# Patient Record
Sex: FEMALE | Race: BLACK OR AFRICAN AMERICAN | NOT HISPANIC OR LATINO | Employment: FULL TIME | URBAN - METROPOLITAN AREA
[De-identification: names, ages, dates, MRNs, and addresses within clinical notes are randomized per-mention and may not be internally consistent; named-entity substitution may affect disease eponyms.]

---

## 2024-01-10 ENCOUNTER — LAB (OUTPATIENT)
Dept: LAB | Facility: LAB | Age: 54
End: 2024-01-10

## 2024-01-10 ENCOUNTER — OFFICE VISIT (OUTPATIENT)
Dept: PRIMARY CARE | Facility: CLINIC | Age: 54
End: 2024-01-10
Payer: COMMERCIAL

## 2024-01-10 VITALS
SYSTOLIC BLOOD PRESSURE: 150 MMHG | OXYGEN SATURATION: 97 % | BODY MASS INDEX: 42.8 KG/M2 | HEART RATE: 82 BPM | DIASTOLIC BLOOD PRESSURE: 80 MMHG | WEIGHT: 266.32 LBS | TEMPERATURE: 98 F | HEIGHT: 66 IN | RESPIRATION RATE: 18 BRPM

## 2024-01-10 DIAGNOSIS — Z80.3 FAMILY HISTORY OF BREAST CANCER: ICD-10-CM

## 2024-01-10 DIAGNOSIS — D50.9 IRON DEFICIENCY ANEMIA, UNSPECIFIED IRON DEFICIENCY ANEMIA TYPE: ICD-10-CM

## 2024-01-10 DIAGNOSIS — E66.01 CLASS 3 SEVERE OBESITY WITHOUT SERIOUS COMORBIDITY WITH BODY MASS INDEX (BMI) OF 40.0 TO 44.9 IN ADULT, UNSPECIFIED OBESITY TYPE (MULTI): ICD-10-CM

## 2024-01-10 DIAGNOSIS — I10 PRIMARY HYPERTENSION: ICD-10-CM

## 2024-01-10 DIAGNOSIS — I10 PRIMARY HYPERTENSION: Primary | ICD-10-CM

## 2024-01-10 DIAGNOSIS — E55.9 VITAMIN D DEFICIENCY: ICD-10-CM

## 2024-01-10 DIAGNOSIS — Z01.419 WOMEN'S ANNUAL ROUTINE GYNECOLOGICAL EXAMINATION: ICD-10-CM

## 2024-01-10 DIAGNOSIS — Z12.31 ENCOUNTER FOR SCREENING MAMMOGRAM FOR BREAST CANCER: ICD-10-CM

## 2024-01-10 DIAGNOSIS — Z12.11 ENCOUNTER FOR SCREENING COLONOSCOPY: ICD-10-CM

## 2024-01-10 DIAGNOSIS — Z80.8 FAMILY HISTORY OF THYROID CANCER: ICD-10-CM

## 2024-01-10 PROBLEM — M47.22 OSTEOARTHRITIS OF SPINE WITH RADICULOPATHY, CERVICAL REGION: Status: ACTIVE | Noted: 2024-01-10

## 2024-01-10 PROBLEM — M54.2 NECK PAIN, ACUTE: Status: RESOLVED | Noted: 2024-01-10 | Resolved: 2024-01-10

## 2024-01-10 PROBLEM — R10.2 PELVIC PAIN IN FEMALE: Status: RESOLVED | Noted: 2024-01-10 | Resolved: 2024-01-10

## 2024-01-10 PROBLEM — E66.813 CLASS 3 SEVERE OBESITY IN ADULT: Status: ACTIVE | Noted: 2024-01-10

## 2024-01-10 PROBLEM — K29.70 GASTRITIS: Status: ACTIVE | Noted: 2024-01-10

## 2024-01-10 PROBLEM — H18.603 KERATOCONUS OF BOTH EYES: Status: ACTIVE | Noted: 2024-01-10

## 2024-01-10 PROBLEM — R10.812 ABDOMINAL LEFT UPPER QUADRANT TENDERNESS: Status: RESOLVED | Noted: 2024-01-10 | Resolved: 2024-01-10

## 2024-01-10 PROBLEM — L29.9 EAR ITCHING: Status: RESOLVED | Noted: 2024-01-10 | Resolved: 2024-01-10

## 2024-01-10 PROBLEM — R09.81 SINUS CONGESTION: Status: RESOLVED | Noted: 2024-01-10 | Resolved: 2024-01-10

## 2024-01-10 PROBLEM — D36.9 DERMOID CYST: Status: RESOLVED | Noted: 2024-01-10 | Resolved: 2024-01-10

## 2024-01-10 PROBLEM — T75.3XXA MOTION SICKNESS: Status: RESOLVED | Noted: 2024-01-10 | Resolved: 2024-01-10

## 2024-01-10 PROBLEM — Z86.59 HISTORY OF PICA: Status: RESOLVED | Noted: 2024-01-10 | Resolved: 2024-01-10

## 2024-01-10 PROBLEM — E28.2 PCOS (POLYCYSTIC OVARIAN SYNDROME): Status: ACTIVE | Noted: 2024-01-10

## 2024-01-10 PROBLEM — R05.9 COUGH: Status: RESOLVED | Noted: 2024-01-10 | Resolved: 2024-01-10

## 2024-01-10 PROBLEM — R10.10 INTERMITTENT UPPER ABDOMINAL PAIN: Status: RESOLVED | Noted: 2024-01-10 | Resolved: 2024-01-10

## 2024-01-10 PROBLEM — R53.83 FATIGUE: Status: RESOLVED | Noted: 2024-01-10 | Resolved: 2024-01-10

## 2024-01-10 PROBLEM — T78.40XA ALLERGIES: Status: ACTIVE | Noted: 2024-01-10

## 2024-01-10 PROBLEM — G47.33 OBSTRUCTIVE SLEEP APNEA: Status: ACTIVE | Noted: 2024-01-10

## 2024-01-10 PROBLEM — M47.812 DEGENERATIVE ARTHRITIS OF CERVICAL SPINE: Status: ACTIVE | Noted: 2024-01-10

## 2024-01-10 PROBLEM — L68.0 HIRSUTISM: Status: ACTIVE | Noted: 2024-01-10

## 2024-01-10 PROBLEM — R10.9 LEFT FLANK PAIN: Status: RESOLVED | Noted: 2024-01-10 | Resolved: 2024-01-10

## 2024-01-10 LAB
25(OH)D3 SERPL-MCNC: 16 NG/ML (ref 30–100)
ALBUMIN SERPL BCP-MCNC: 4.2 G/DL (ref 3.4–5)
ALP SERPL-CCNC: 85 U/L (ref 33–110)
ALT SERPL W P-5'-P-CCNC: 17 U/L (ref 7–45)
ANION GAP SERPL CALC-SCNC: 11 MMOL/L (ref 10–20)
AST SERPL W P-5'-P-CCNC: 15 U/L (ref 9–39)
BASOPHILS # BLD AUTO: 0.03 X10*3/UL (ref 0–0.1)
BASOPHILS NFR BLD AUTO: 0.5 %
BILIRUB SERPL-MCNC: 0.4 MG/DL (ref 0–1.2)
BUN SERPL-MCNC: 9 MG/DL (ref 6–23)
CALCIUM SERPL-MCNC: 8.9 MG/DL (ref 8.6–10.6)
CHLORIDE SERPL-SCNC: 105 MMOL/L (ref 98–107)
CHOLEST SERPL-MCNC: 186 MG/DL (ref 0–199)
CHOLESTEROL/HDL RATIO: 4.7
CO2 SERPL-SCNC: 30 MMOL/L (ref 21–32)
CREAT SERPL-MCNC: 0.91 MG/DL (ref 0.5–1.05)
EGFRCR SERPLBLD CKD-EPI 2021: 77 ML/MIN/1.73M*2
EOSINOPHIL # BLD AUTO: 0.16 X10*3/UL (ref 0–0.7)
EOSINOPHIL NFR BLD AUTO: 2.9 %
ERYTHROCYTE [DISTWIDTH] IN BLOOD BY AUTOMATED COUNT: 14.5 % (ref 11.5–14.5)
GLUCOSE SERPL-MCNC: 127 MG/DL (ref 74–99)
HCT VFR BLD AUTO: 39.5 % (ref 36–46)
HDLC SERPL-MCNC: 39.3 MG/DL
HGB BLD-MCNC: 13.2 G/DL (ref 12–16)
IMM GRANULOCYTES # BLD AUTO: 0 X10*3/UL (ref 0–0.7)
IMM GRANULOCYTES NFR BLD AUTO: 0 % (ref 0–0.9)
LDLC SERPL CALC-MCNC: 118 MG/DL
LYMPHOCYTES # BLD AUTO: 1.91 X10*3/UL (ref 1.2–4.8)
LYMPHOCYTES NFR BLD AUTO: 34 %
MCH RBC QN AUTO: 28.8 PG (ref 26–34)
MCHC RBC AUTO-ENTMCNC: 33.4 G/DL (ref 32–36)
MCV RBC AUTO: 86 FL (ref 80–100)
MONOCYTES # BLD AUTO: 0.32 X10*3/UL (ref 0.1–1)
MONOCYTES NFR BLD AUTO: 5.7 %
NEUTROPHILS # BLD AUTO: 3.19 X10*3/UL (ref 1.2–7.7)
NEUTROPHILS NFR BLD AUTO: 56.9 %
NON HDL CHOLESTEROL: 147 MG/DL (ref 0–149)
NRBC BLD-RTO: 0 /100 WBCS (ref 0–0)
PLATELET # BLD AUTO: 213 X10*3/UL (ref 150–450)
POTASSIUM SERPL-SCNC: 3.9 MMOL/L (ref 3.5–5.3)
PROT SERPL-MCNC: 6.7 G/DL (ref 6.4–8.2)
RBC # BLD AUTO: 4.58 X10*6/UL (ref 4–5.2)
SODIUM SERPL-SCNC: 142 MMOL/L (ref 136–145)
TRIGL SERPL-MCNC: 142 MG/DL (ref 0–149)
VLDL: 28 MG/DL (ref 0–40)
WBC # BLD AUTO: 5.6 X10*3/UL (ref 4.4–11.3)

## 2024-01-10 PROCEDURE — 1036F TOBACCO NON-USER: CPT | Performed by: INTERNAL MEDICINE

## 2024-01-10 PROCEDURE — 3077F SYST BP >= 140 MM HG: CPT | Performed by: INTERNAL MEDICINE

## 2024-01-10 PROCEDURE — 80061 LIPID PANEL: CPT

## 2024-01-10 PROCEDURE — 3079F DIAST BP 80-89 MM HG: CPT | Performed by: INTERNAL MEDICINE

## 2024-01-10 PROCEDURE — 3008F BODY MASS INDEX DOCD: CPT | Performed by: INTERNAL MEDICINE

## 2024-01-10 PROCEDURE — 36415 COLL VENOUS BLD VENIPUNCTURE: CPT

## 2024-01-10 PROCEDURE — 85025 COMPLETE CBC W/AUTO DIFF WBC: CPT

## 2024-01-10 PROCEDURE — 80053 COMPREHEN METABOLIC PANEL: CPT

## 2024-01-10 PROCEDURE — 99204 OFFICE O/P NEW MOD 45 MIN: CPT | Performed by: INTERNAL MEDICINE

## 2024-01-10 PROCEDURE — 82306 VITAMIN D 25 HYDROXY: CPT

## 2024-01-10 ASSESSMENT — ENCOUNTER SYMPTOMS
SPEECH DIFFICULTY: 0
TREMORS: 0
DYSURIA: 0
PALPITATIONS: 0
BRUISES/BLEEDS EASILY: 0
POLYPHAGIA: 0
HEMATURIA: 0
FLANK PAIN: 0
DIAPHORESIS: 0
ARTHRALGIAS: 0
BACK PAIN: 0
PHOTOPHOBIA: 0
CHILLS: 0
STRIDOR: 0
NUMBNESS: 0
WEAKNESS: 0
DIZZINESS: 0
FATIGUE: 0
ADENOPATHY: 0
SEIZURES: 0
APPETITE CHANGE: 0
HEADACHES: 0
COUGH: 0

## 2024-01-10 ASSESSMENT — PAIN SCALES - GENERAL: PAINLEVEL: 0-NO PAIN

## 2024-01-10 NOTE — PATIENT INSTRUCTIONS
Have screening mammogram.  Schedule screening colonoscopy.  Have fasting blood work.  Referral made to see .  See gynecologist for pelvic exam.  Follow-up in 2 months for annual physical exam.

## 2024-01-10 NOTE — PROGRESS NOTES
"Subjective   Patient ID: Larisa Villagomez is a 53 y.o. female who presents for New Patient Visit.    New patient visit.  Did not see a primary care physician in more than  1  year.  She takes no prescription medications.  BMI 42.  Diagnosed in the past with mild obstructive sleep apnea and does not use CPAP.  Has positive family history of breast cancer mother, and thyroid cancer Brother.  Would like to have genetic testing.         Review of Systems   Constitutional:  Negative for appetite change, chills, diaphoresis and fatigue.   HENT:  Negative for congestion, ear discharge, hearing loss, mouth sores and postnasal drip.    Eyes:  Negative for photophobia and visual disturbance.   Respiratory:  Negative for cough and stridor.    Cardiovascular:  Negative for palpitations and leg swelling.   Endocrine: Negative for cold intolerance, heat intolerance, polyphagia and polyuria.   Genitourinary:  Negative for decreased urine volume, dysuria, enuresis, flank pain and hematuria.   Musculoskeletal:  Negative for arthralgias, back pain and gait problem.   Allergic/Immunologic: Negative for food allergies and immunocompromised state.   Neurological:  Negative for dizziness, tremors, seizures, syncope, speech difficulty, weakness, numbness and headaches.   Hematological:  Negative for adenopathy. Does not bruise/bleed easily.       Objective   Pulse 82   Temp 36.7 °C (98 °F) (Temporal)   Resp 18   Ht 1.68 m (5' 6.14\")   Wt 121 kg (266 lb 5.1 oz)   SpO2 97%   BMI 42.80 kg/m²     Physical Exam  Constitutional:       Appearance: Normal appearance. She is obese.   HENT:      Head: Normocephalic and atraumatic.      Right Ear: External ear normal.      Left Ear: External ear normal.      Mouth/Throat:      Mouth: Mucous membranes are moist.      Pharynx: Oropharynx is clear.   Neck:      Vascular: No carotid bruit.   Cardiovascular:      Rate and Rhythm: Normal rate and regular rhythm.      Heart sounds: No murmur " heard.     No gallop.   Pulmonary:      Effort: Pulmonary effort is normal. No respiratory distress.      Breath sounds: No wheezing or rales.   Abdominal:      General: Abdomen is flat.      Palpations: Abdomen is soft.      Hernia: No hernia is present.   Genitourinary:     Comments: Not examined  Musculoskeletal:         General: No swelling, tenderness, deformity or signs of injury.      Cervical back: No rigidity or tenderness.      Right lower leg: No edema.   Lymphadenopathy:      Cervical: No cervical adenopathy.   Skin:     Coloration: Skin is not jaundiced or pale.      Findings: No bruising, erythema, lesion or rash.   Neurological:      General: No focal deficit present.      Mental Status: She is oriented to person, place, and time.      Motor: No weakness.      Coordination: Coordination normal.   Psychiatric:         Mood and Affect: Mood normal.         Behavior: Behavior normal.         Assessment/Plan   Problem List Items Addressed This Visit             ICD-10-CM    Anemia, iron deficiency D50.9     Check CBC with differential iron studies.         Relevant Orders    CBC and Auto Differential    Primary hypertension - Primary I10     Patient on medication for hypertension.    Advised lifestyle modifications, follow low-sodium diet do not exceed 200 mg per serving, advised weight loss, increase physical activity.  Recommend purchasing a blood pressure monitor and check blood pressure at home daily for the next week and call if readings are over 140/80.         Relevant Orders    Comprehensive Metabolic Panel    Lipid Panel    Vitamin D deficiency E55.9    Relevant Orders    Vitamin D 25-Hydroxy,Total (for eval of Vitamin D levels)    Class 3 severe obesity in adult (CMS/HCC) E66.01     Discussed weight loss follow low caloric diet          Other Visit Diagnoses         Codes    Encounter for screening mammogram for breast cancer     Z12.31    Relevant Orders    BI mammo bilateral screening  tomosynthesis    Encounter for screening colonoscopy     Z12.11    Relevant Orders    Colonoscopy Screening; Average Risk Patient    Family history of breast cancer     Z80.3    Relevant Orders    Referral to Genetics    Family history of thyroid cancer     Z80.8    Relevant Orders    Referral to Genetics    Women's annual routine gynecological examination     Z01.419    Relevant Orders    Referral to Obstetrics / Gynecology

## 2024-01-10 NOTE — ASSESSMENT & PLAN NOTE
Patient on medication for hypertension.    Advised lifestyle modifications, follow low-sodium diet do not exceed 200 mg per serving, advised weight loss, increase physical activity.  Recommend purchasing a blood pressure monitor and check blood pressure at home daily for the next week and call if readings are over 140/80.

## 2024-01-11 DIAGNOSIS — E55.9 VITAMIN D DEFICIENCY: Primary | ICD-10-CM

## 2024-01-11 RX ORDER — ERGOCALCIFEROL 1.25 MG/1
50000 CAPSULE ORAL
Qty: 12 CAPSULE | Refills: 0 | Status: SHIPPED | OUTPATIENT
Start: 2024-01-11 | End: 2024-05-07

## 2024-01-15 DIAGNOSIS — Z12.11 SPECIAL SCREENING FOR MALIGNANT NEOPLASMS, COLON: ICD-10-CM

## 2024-01-16 RX ORDER — POLYETHYLENE GLYCOL 3350, SODIUM SULFATE ANHYDROUS, SODIUM BICARBONATE, SODIUM CHLORIDE, POTASSIUM CHLORIDE 236; 22.74; 6.74; 5.86; 2.97 G/4L; G/4L; G/4L; G/4L; G/4L
POWDER, FOR SOLUTION ORAL
Qty: 4000 ML | Refills: 0 | Status: SHIPPED | OUTPATIENT
Start: 2024-01-16

## 2024-01-17 ENCOUNTER — ANCILLARY PROCEDURE (OUTPATIENT)
Dept: RADIOLOGY | Facility: CLINIC | Age: 54
End: 2024-01-17
Payer: COMMERCIAL

## 2024-01-17 DIAGNOSIS — Z12.31 ENCOUNTER FOR SCREENING MAMMOGRAM FOR BREAST CANCER: ICD-10-CM

## 2024-01-17 PROCEDURE — 77067 SCR MAMMO BI INCL CAD: CPT | Performed by: STUDENT IN AN ORGANIZED HEALTH CARE EDUCATION/TRAINING PROGRAM

## 2024-01-17 PROCEDURE — 77063 BREAST TOMOSYNTHESIS BI: CPT | Performed by: STUDENT IN AN ORGANIZED HEALTH CARE EDUCATION/TRAINING PROGRAM

## 2024-01-17 PROCEDURE — 77067 SCR MAMMO BI INCL CAD: CPT

## 2024-02-14 ENCOUNTER — INITIAL PRENATAL (OUTPATIENT)
Dept: OBSTETRICS AND GYNECOLOGY | Facility: CLINIC | Age: 54
End: 2024-02-14
Payer: COMMERCIAL

## 2024-02-14 VITALS
SYSTOLIC BLOOD PRESSURE: 130 MMHG | DIASTOLIC BLOOD PRESSURE: 100 MMHG | HEIGHT: 66 IN | BODY MASS INDEX: 43.23 KG/M2 | WEIGHT: 269 LBS

## 2024-02-14 DIAGNOSIS — Z01.419 WELL WOMAN EXAM: Primary | ICD-10-CM

## 2024-02-14 DIAGNOSIS — Z01.419 WOMEN'S ANNUAL ROUTINE GYNECOLOGICAL EXAMINATION: ICD-10-CM

## 2024-02-14 DIAGNOSIS — M62.89 PELVIC FLOOR DYSFUNCTION IN FEMALE: ICD-10-CM

## 2024-02-14 PROCEDURE — 87624 HPV HI-RISK TYP POOLED RSLT: CPT

## 2024-02-14 PROCEDURE — 99396 PREV VISIT EST AGE 40-64: CPT

## 2024-02-14 PROCEDURE — 88175 CYTOPATH C/V AUTO FLUID REDO: CPT

## 2024-02-14 ASSESSMENT — ENCOUNTER SYMPTOMS
GASTROINTESTINAL NEGATIVE: 0
NEUROLOGICAL NEGATIVE: 0
PSYCHIATRIC NEGATIVE: 0
ALLERGIC/IMMUNOLOGIC NEGATIVE: 0
EYES NEGATIVE: 0
CONSTITUTIONAL NEGATIVE: 0
CARDIOVASCULAR NEGATIVE: 0
ENDOCRINE NEGATIVE: 0
RESPIRATORY NEGATIVE: 0
HEMATOLOGIC/LYMPHATIC NEGATIVE: 0
MUSCULOSKELETAL NEGATIVE: 0

## 2024-02-14 ASSESSMENT — PAIN SCALES - GENERAL: PAINLEVEL_OUTOF10: 0 - NO PAIN

## 2024-02-14 ASSESSMENT — PAIN - FUNCTIONAL ASSESSMENT: PAIN_FUNCTIONAL_ASSESSMENT: 0-10

## 2024-02-14 NOTE — PROGRESS NOTES
"Assessment/Plan   Diagnoses and all orders for this visit:  Well woman exam  Women's annual routine gynecological examination  -     Referral to Obstetrics / Gynecology  -     THINPREP PAP  Pelvic floor dysfunction in female  -     Referral to Physical Therapy; Future    Discussed pelvic floor therapy for problems during cycles. Discussed that there was no evidence of prolapse of vaginal exam.   Discussed follow up with primary care doctor for health management.   Encouraged annual follow up and follow up PRN.      Bethany ROBLERO Weiner, APRN-CNM     Subjective   Larisa Villagomez \"Larisa  NOT RICKEY\" is a 53 y.o. female who is here for a routine exam.     Concerns today:  Patient reports that when she does have her period she has a lot pain and sometimes has to \"reach in and lift up [her] uterus\" she reports that after that she has more bleeding.     Patient's last menstrual period was 12/30/2023.   Hot flashes.   Sporadic periods.   Weight gain.     At one point she went four months without a period. Sometimes it will 30 days in between, she is noticing a stretching out of the cycles. Periods usually last 5-7 days with moderate/severe pain. Patient uses a menstrual cup.     Posterior uterus, bleeding and then the bleeding with stop, uterus with throb. Will lift cervix and then it will help. This started within the last year. Really painful. It's affecting lift. Patient is not interested in surgery.     Sexual Activity: sexually active, male partners; Patient reports 1 partners in the last 12 months.  Pain with intercourse? No   Loss of desire? No   Able to have an orgasm? Yes     History of prior STI: none    Current contraception: none    Last pap: 2023- patient reports that it was normal is sure of pap. Next due. 2028.   History of abnormal Pap smear: yes - cone biopsy in 1998 .  Family history of uterine or ovarian cancer: no  History of dermoid cyst, only has one ovary- 2017 9cm cyst.   Last mammogram: 1/17/24  History " "of abnormal mammogram: no  Family history of breast cancer: yes - mother diagnosed at age 40 and 62.   Menstrual History:  OB History          4    Para   3    Term   3            AB   1    Living             SAB        IAB   1    Ectopic        Multiple        Live Births                    Menarche age:   Patient's last menstrual period was 2023.     Objective   BP (!) 130/100 (BP Location: Right arm, Patient Position: Sitting)   Ht 1.676 m (5' 6\")   Wt 122 kg (269 lb)   LMP 2023 Comment: patient states she sometimes doesnt get it for a month or two  BMI 43.42 kg/m²   Physical Exam  Constitutional:       Appearance: Normal appearance. She is obese.   Genitourinary:      Vulva normal.      Genitourinary Comments: Dark spots present on cervix.       Right Labia: No rash, tenderness, lesions or skin changes.     Left Labia: No tenderness, lesions, skin changes or rash.     No labial fusion noted.      No inguinal adenopathy present in the right or left side.     No vaginal prolapse present.     No vaginal atrophy present.       Right Adnexa: not tender and no mass present.     Left Adnexa: no mass present.     Cervical lesion present.      No cervical discharge.         Cardiovascular:      Rate and Rhythm: Normal rate and regular rhythm.      Pulses: Normal pulses.      Heart sounds: Normal heart sounds.   Pulmonary:      Effort: Pulmonary effort is normal.      Breath sounds: Normal breath sounds.   Abdominal:      General: Bowel sounds are normal.      Palpations: Abdomen is soft.      Hernia: There is no hernia in the left inguinal area or right inguinal area.   Lymphadenopathy:      Lower Body: No right inguinal adenopathy. No left inguinal adenopathy.   Neurological:      Mental Status: She is alert and oriented to person, place, and time.   Skin:     General: Skin is warm and dry.   Psychiatric:         Mood and Affect: Mood normal.         Behavior: Behavior normal.         " Thought Content: Thought content normal.         Judgment: Judgment normal.

## 2024-02-26 ENCOUNTER — TELEPHONE (OUTPATIENT)
Dept: OBSTETRICS AND GYNECOLOGY | Facility: CLINIC | Age: 54
End: 2024-02-26
Payer: COMMERCIAL

## 2024-02-26 NOTE — TELEPHONE ENCOUNTER
Called patient back regarding her pap results.  Verified patient by name and .  Informed her that pap results aren't in yet but as soon as they are read by Bethany we will give her a call.  No other questions or concerns at this time.    HERNANDO Mascorro RN

## 2024-02-28 ENCOUNTER — OFFICE VISIT (OUTPATIENT)
Dept: GASTROENTEROLOGY | Facility: EXTERNAL LOCATION | Age: 54
End: 2024-02-28
Payer: COMMERCIAL

## 2024-02-28 DIAGNOSIS — Z12.11 ENCOUNTER FOR SCREENING COLONOSCOPY: ICD-10-CM

## 2024-02-28 DIAGNOSIS — D12.2 BENIGN NEOPLASM OF ASCENDING COLON: Primary | ICD-10-CM

## 2024-02-28 PROCEDURE — 0753T DGTZ GLS MCRSCP SLD LEVEL IV: CPT

## 2024-02-28 PROCEDURE — 88305 TISSUE EXAM BY PATHOLOGIST: CPT | Performed by: PATHOLOGY

## 2024-02-28 PROCEDURE — 45385 COLONOSCOPY W/LESION REMOVAL: CPT | Performed by: INTERNAL MEDICINE

## 2024-02-28 PROCEDURE — 88305 TISSUE EXAM BY PATHOLOGIST: CPT

## 2024-02-28 PROCEDURE — 1036F TOBACCO NON-USER: CPT | Performed by: INTERNAL MEDICINE

## 2024-02-28 PROCEDURE — 3008F BODY MASS INDEX DOCD: CPT | Performed by: INTERNAL MEDICINE

## 2024-02-28 NOTE — PROGRESS NOTES
Colonoscopy performed today 2/28/2024 at [place] [facility] [place] [place]  See procedure report(s) under Media tab.

## 2024-02-29 ENCOUNTER — LAB REQUISITION (OUTPATIENT)
Dept: LAB | Facility: HOSPITAL | Age: 54
End: 2024-02-29
Payer: COMMERCIAL

## 2024-03-01 LAB
CYTOLOGY CMNT CVX/VAG CYTO-IMP: NORMAL
HPV HR 12 DNA GENITAL QL NAA+PROBE: NEGATIVE
HPV HR GENOTYPES PNL CVX NAA+PROBE: NEGATIVE
HPV16 DNA SPEC QL NAA+PROBE: NEGATIVE
HPV18 DNA SPEC QL NAA+PROBE: NEGATIVE
LAB AP HPV GENOTYPE QUESTION: YES
LAB AP HPV HR: NORMAL
LABORATORY COMMENT REPORT: NORMAL
PATH REPORT.TOTAL CANCER: NORMAL

## 2024-03-06 LAB
LABORATORY COMMENT REPORT: NORMAL
PATH REPORT.FINAL DX SPEC: NORMAL
PATH REPORT.GROSS SPEC: NORMAL
PATH REPORT.RELEVANT HX SPEC: NORMAL
PATH REPORT.TOTAL CANCER: NORMAL

## 2024-03-13 ENCOUNTER — APPOINTMENT (OUTPATIENT)
Dept: PRIMARY CARE | Facility: CLINIC | Age: 54
End: 2024-03-13
Payer: COMMERCIAL

## 2024-04-03 ENCOUNTER — OFFICE VISIT (OUTPATIENT)
Dept: PRIMARY CARE | Facility: CLINIC | Age: 54
End: 2024-04-03
Payer: COMMERCIAL

## 2024-04-03 VITALS
BODY MASS INDEX: 44.04 KG/M2 | SYSTOLIC BLOOD PRESSURE: 150 MMHG | HEART RATE: 90 BPM | WEIGHT: 274.03 LBS | OXYGEN SATURATION: 97 % | HEIGHT: 66 IN | TEMPERATURE: 97.9 F | DIASTOLIC BLOOD PRESSURE: 95 MMHG | RESPIRATION RATE: 18 BRPM

## 2024-04-03 DIAGNOSIS — R05.3 CHRONIC COUGH: ICD-10-CM

## 2024-04-03 DIAGNOSIS — Z00.00 ENCOUNTER FOR ANNUAL PHYSICAL EXAM: Primary | ICD-10-CM

## 2024-04-03 DIAGNOSIS — I10 PRIMARY HYPERTENSION: ICD-10-CM

## 2024-04-03 DIAGNOSIS — R73.09 ABNORMAL GLUCOSE: ICD-10-CM

## 2024-04-03 DIAGNOSIS — R05.9 COUGH, UNSPECIFIED TYPE: ICD-10-CM

## 2024-04-03 DIAGNOSIS — E11.9 CONTROLLED TYPE 2 DIABETES MELLITUS WITHOUT COMPLICATION, WITHOUT LONG-TERM CURRENT USE OF INSULIN (MULTI): ICD-10-CM

## 2024-04-03 PROBLEM — K29.70 GASTRITIS: Status: RESOLVED | Noted: 2024-01-10 | Resolved: 2024-04-03

## 2024-04-03 PROBLEM — N83.519 TORSION OF OVARY: Status: ACTIVE | Noted: 2024-04-03

## 2024-04-03 PROBLEM — T78.40XA ALLERGIES: Status: RESOLVED | Noted: 2024-01-10 | Resolved: 2024-04-03

## 2024-04-03 PROBLEM — D27.9 TERATOMA OF OVARY: Status: ACTIVE | Noted: 2024-04-03

## 2024-04-03 PROBLEM — Z98.890 HISTORY OF SURGICAL PROCEDURE: Status: RESOLVED | Noted: 2024-04-03 | Resolved: 2024-04-03

## 2024-04-03 LAB — POC HEMOGLOBIN A1C: 6.8 % (ref 4.2–6.5)

## 2024-04-03 PROCEDURE — 4010F ACE/ARB THERAPY RXD/TAKEN: CPT | Performed by: INTERNAL MEDICINE

## 2024-04-03 PROCEDURE — 3049F LDL-C 100-129 MG/DL: CPT | Performed by: INTERNAL MEDICINE

## 2024-04-03 PROCEDURE — 3077F SYST BP >= 140 MM HG: CPT | Performed by: INTERNAL MEDICINE

## 2024-04-03 PROCEDURE — 3080F DIAST BP >= 90 MM HG: CPT | Performed by: INTERNAL MEDICINE

## 2024-04-03 PROCEDURE — 99396 PREV VISIT EST AGE 40-64: CPT | Performed by: INTERNAL MEDICINE

## 2024-04-03 PROCEDURE — 1036F TOBACCO NON-USER: CPT | Performed by: INTERNAL MEDICINE

## 2024-04-03 PROCEDURE — 3008F BODY MASS INDEX DOCD: CPT | Performed by: INTERNAL MEDICINE

## 2024-04-03 PROCEDURE — 83036 HEMOGLOBIN GLYCOSYLATED A1C: CPT | Performed by: INTERNAL MEDICINE

## 2024-04-03 RX ORDER — METFORMIN HYDROCHLORIDE 500 MG/1
500 TABLET ORAL
Qty: 100 TABLET | Refills: 3 | Status: SHIPPED | OUTPATIENT
Start: 2024-04-03 | End: 2025-05-08

## 2024-04-03 RX ORDER — LOSARTAN POTASSIUM 50 MG/1
50 TABLET ORAL DAILY
Qty: 30 TABLET | Refills: 11 | Status: SHIPPED | OUTPATIENT
Start: 2024-04-03 | End: 2025-04-03

## 2024-04-03 RX ORDER — ALBUTEROL SULFATE 90 UG/1
2 AEROSOL, METERED RESPIRATORY (INHALATION) EVERY 4 HOURS PRN
Qty: 18 G | Refills: 2 | Status: SHIPPED | OUTPATIENT
Start: 2024-04-03 | End: 2025-04-03

## 2024-04-03 RX ORDER — BIOTIN 5 MG
1 TABLET ORAL
COMMUNITY

## 2024-04-03 ASSESSMENT — ENCOUNTER SYMPTOMS
LOSS OF SENSATION IN FEET: 0
OCCASIONAL FEELINGS OF UNSTEADINESS: 0
DEPRESSION: 0

## 2024-04-03 ASSESSMENT — PATIENT HEALTH QUESTIONNAIRE - PHQ9
SUM OF ALL RESPONSES TO PHQ9 QUESTIONS 1 AND 2: 1
1. LITTLE INTEREST OR PLEASURE IN DOING THINGS: NOT AT ALL
2. FEELING DOWN, DEPRESSED OR HOPELESS: SEVERAL DAYS

## 2024-04-03 ASSESSMENT — PAIN SCALES - GENERAL: PAINLEVEL: 0-NO PAIN

## 2024-04-03 NOTE — PROGRESS NOTES
"History of Present Illness:  Larisa Villagomez  is a 53 y.o. female with a family history of cancer.  Larisa Villagomez  was referred to the Cancer Genetics Clinic at [facility] by Carole Ferrara MD. Larisa Villagomez is interested in genetic testing to clarify their personal risk for cancer, as well as the risks to their family members.    Cancer Medical History:  Personal history of cancer? No     Prior genetic testing? No     Cancer screening history:  Mammograms? Yes, most recent 24   Patient denies personal history of breast biopsy.   PAP smear? Yes, most recent 24  H/o abnormal pap in  requiring biopsy. All wnl since.  Colonoscopy? Yes, most recent 24  which identified 1 adenoma  Upper endoscopy? One at the time after unilateral oophorectomy d/t abdominal which was wnl   Dermatology? No   Other cancer screening? None    Reproductive History:  Number of children: 3  Number of pregnancies: 4  Age first birth: 20  Breast feeding? Yes , 3 years  Menarche (age): 16  Menopause (age): Perimenopausal  OCP: Yes , 7 years  HRT: No   Breast tissue: scattered fibroglandular tissue   Hysterectomy? No   Oophorectomy? Yes  right ovary and fallopian tube removed d/t large dermoid cyst.     Family history:  A 4-generation pedigree was obtained and was significant for the following:   No sisters  One daughter (living, 33) without cancer or tumor history  One brother (, 56) with thyroid cancer at 48  Mother (, 65) with breast cancer at 48 (recurred at 64, no genetics)  Maternal uncle (living, age >50) without cancer or tumor history  Maternal half-sister (through grandmother, living, 62) with a h/o bresat cancer at 52 (no genetic testing)  Maternal grandmother (living, 90s) without cancer or tumor history. She had 12 siblings, one of her sisters had colon cancer  Maternal grandfather (, \"old\" age) without cancer or tumor history  Father (, 80) without cancer " or tumor history  No paternal aunts/uncles  Paternal grandmother (, 92) without cancer or tumor history  Paternal grandfather (, 70s) without cancer or tumor history  Maternal ancestry is , German, and .  Paternal ancestry is . There is no known Ashkenazi Jainism ancestry. Consanguinity was denied.       Discussion:  Larisa Villagomez is a 53 y.o. old female with a family history of cancer.  Based on her mother's history of breast cancer at an age < or = 50 (who is unavailable for her own testing), Larisa Villagomez meets NCCN criteria for testing of the BRCA1 and BRCA2 genes. She is interested in testing, which is recommended, and was ordered today via the 71-gene CancerNext-Expanded panel from [employer]. Our discussion is summarized below.    We reviewed genes and chromosomes, inherited forms of breast and ovarian cancer, and the BRCA1 and BRCA2 genes causing HBOC. We discussed that most cancers are not due to an inherited genetic susceptibility. However, in about 5-10% of families, there is an inherited genetic mutation that can make a person more susceptible to developing certain forms of cancer. Within these families, we often see multiple family members with cancer, occurring in multiple generations. In addition, earlier onset and bilateral cancers are suggestive of an inherited form of cancer. Finally, there is a clustering of certain types of cancer in these families, such as breast and ovarian cancer.    We discussed the BRCA1 and BRCA2 genes, which are two genes that have been linked to early-onset breast and/or ovarian cancer. Changes in these genes (sometimes referred to as mutations) are inherited in a dominant pattern and confer >60% lifetime risk for breast cancer. This is elevated compared to the general population risk of 13%. In addition, BRCA1 and BRCA2 mutation carriers have up to a 58% lifetime risk for ovarian cancer, which is  elevated over the 1.3% general population risk. Mutation carriers who have already been diagnosed with cancer have an increased risk to develop a second, contralateral breast cancer. BRCA2 gene mutation carriers have an increased risk for male breast cancer, prostate cancer, melanoma and pancreatic cancer.    We discussed that there are multiple genes associated with increased breast cancer risk. Some genes, like the BRCA1 and BRCA2 genes, are considered highly penetrant breast cancer genes, meaning a mutation in the gene confers a high risk of breast cancer. Additionally, there are other intermediate (moderate risk) breast cancer genes. For some of the moderate risk genes, there is often limited information regarding the degree to which a mutation in the gene affects risk of different types of cancers. Additionally, for some of these moderate risk genes, the appropriate management for individuals who have a mutation in one of these genes is not always clear. Our knowledge about the cancer risks associated with mutations in these moderate risk genes is always growing, and we will likely be able to provide more comprehensive information in the future.     Gene mutations in most cancer risk genes, i.e. BRCA1 and BRCA2, are inherited in an autosomal dominant fashion. This means that if an individual has a change in either of these genes, their siblings, parents, and children have a 50% chance of also having that gene change and a 50% chance of not having the gene change.     We reviewed the three results we can get back:  1. Positive- Identified a change in a cancer gene that confers an increased cancer risk. We will discuss potential changes in management for her and her family based on the specific gene mutation found.  2. Negative- Clears her for the cancer predisposition syndrome we assessed, but cannot clear her for all cancer predisposition risks. Along this line, we discussed that another member of the family  could still have a hereditary predisposition that she did not happen to inherit (even if she comes back negative). For this reason, other members of her family may still wish to consider their own testing. We discussed that technically it would be more informative for someone affected with cancer to be tested (as they would be the more likely person to have a hereditary cancer predisposition syndrome than someone unaffected by cancer). This would help to more accurately assess her risks and the family's cancer risks as a whole. That being said, if genetic testing is not feasible or easily done in another, affect family member, testing an unaffected individual can still be undergone.  3. Variant of Uncertain Significance (VUS) - We discussed should an uncertain result come back that this would be treated like a negative result (i.e. no management recommendation will be made no familial variant testing) as the implications of this finding are currently unknown.    Lastly, we discussed the Genetic Information Non-discrimination Act ([place]) of 2008. We discussed that per this federal law, employers (at companies with 15 employees or greater) and health insurance companies (barring [employer] and other  insurances) are forbidden to ask for and use genetic information against another person. As such, health insurance companies cannot ask for genetic information and use findings affect coverage or rates. However, luxury insurances such as life insurance, long term care insurance, and/or private disability insurance companies are not forbidden against using genetic information when an individual takes out a new/additional policy in one of those areas. As such, for unaffected individuals it could be beneficial to explore/take out policies in luxury insurance areas PRIOR to undergoing genetic testing.    Larisa Villagomez was counseled about hereditary cancer susceptibility including cancer risks, options for increased  screening and/or risk reduction, genetic testing, and the implications for other family members. We discussed performing genetic testing in the context of a multi-gene panel test that looks at the BRCA1 and BRCA2, as well as moderate penetrant genes. She is interested in this approach, which is recommended and was ordered today via the 71-gene CancerNext-Expanded panel from [employer].    Results are typically available within 3-4 weeks, and Larisa Villagomez will return to the Cancer Genetics Clinic to discuss her testing results. At that time, we will make recommendations for both Larisa Villagomez and her family members in terms of cancer screening and/or cancer risk reduction options.         PLAN:  1.  Larisa Villagomez elected to undergo genetic testing via a panel test that analyzes 71 genes associated with breast and other cancer risks. Consent for testing was verbalized and a plan made to collect a blood sample. The sample will be sent to [facility] for analysis. Results are typically available in 3-4 weeks.    2. Larisa Villagomez will return to the Cancer Genetics Clinic in approximately 3-4 weeks to discuss her test results.     3. We remain available to Larisa Villagomez or her family members at [phone] if any questions arise regarding information discussed at today's visit.    Marge Boswell MS, [facility]  Certified Genetic Counselor  [facility]  [phone]    Reviewed by:  Dr. Karen Alcala  Clinical   [facility]  [phone]

## 2024-04-03 NOTE — ASSESSMENT & PLAN NOTE
Fasting blood glucose 127.  Hemoglobin A1c done in the office 6.8.  Metformin 500 mg with dinner.  Discussed diet avoid rice potatoes pasta sweets sweet drinks.  Increase physical activity exercise regularly to lose weight.  She declined bariatric surgery in the past.

## 2024-04-03 NOTE — ASSESSMENT & PLAN NOTE
Blood pressure on the high side.  Recommend to start losartan 50 mg daily.  Follow low-sodium diet do not exceed 200 mg per serving.  Avoid alcohol.

## 2024-04-03 NOTE — ASSESSMENT & PLAN NOTE
Declines vaccines.  She did not receive COVID vaccine.  Her last screening mammogram was in January this year.  Has seen gynecologist this year has had a normal screening Pap test.  Has had colonoscopy in February this year, found to have 1 adenomatous polyp, was recommended to return in 5 years.  Discussed weight loss follow low caloric diet avoid rice potatoes pasta sweets.  Increase physical activity.

## 2024-04-03 NOTE — PROGRESS NOTES
"Subjective   Patient ID: Larisa Villagomez is a 53 y.o. female who presents for Annual Exam.    Annual physical exam.  She takes no prescription medications except vitamin D D 50,000 units once a week for 3 months recent vitamin D 25 level was low at 16.  Her fasting blood glucose was 127.  She works from home 4 days a week and 1 day she goes to the office she works for the [employer].  Today she has to go to work once she enters the building she starts coughing she coughs throughout the day.         Review of Systems   Respiratory:          Occasional cough   All other systems reviewed and are negative.      Objective   BP (!) 150/95 (BP Location: Left arm)   Pulse 90   Temp 36.6 °C (97.9 °F) (Temporal)   Resp 18   Ht 1.68 m (5' 6.14\")   Wt 124 kg (274 lb 0.5 oz)   SpO2 97%   BMI 44.04 kg/m²     Physical Exam  Constitutional:       Appearance: Normal appearance. She is obese.   HENT:      Head: Normocephalic and atraumatic.      Right Ear: External ear normal.      Left Ear: External ear normal.      Mouth/Throat:      Mouth: Mucous membranes are moist.      Pharynx: Oropharynx is clear.   Neck:      Vascular: No carotid bruit.   Cardiovascular:      Rate and Rhythm: Normal rate and regular rhythm.      Heart sounds: No murmur heard.     No gallop.   Pulmonary:      Effort: Pulmonary effort is normal. No respiratory distress.      Breath sounds: No wheezing or rales.   Chest:   Breasts:     Right: Normal.      Left: Normal.   Abdominal:      General: Abdomen is flat.      Palpations: Abdomen is soft.      Hernia: No hernia is present.   Musculoskeletal:         General: No swelling, tenderness, deformity or signs of injury.      Cervical back: No rigidity or tenderness.      Right lower leg: No edema.   Lymphadenopathy:      Cervical: No cervical adenopathy.   Skin:     Coloration: Skin is not jaundiced or pale.      Findings: No bruising, erythema, lesion or rash.   Neurological:      General: No focal " deficit present.      Mental Status: She is oriented to person, place, and time.      Motor: No weakness.      Coordination: Coordination normal.   Psychiatric:         Mood and Affect: Mood normal.         Behavior: Behavior normal.         Assessment/Plan   Problem List Items Addressed This Visit             ICD-10-CM    Chronic cough R05.3    Primary hypertension I10     Blood pressure on the high side.  Recommend to start losartan 50 mg daily.  Follow low-sodium diet do not exceed 200 mg per serving.  Avoid alcohol.         Relevant Medications    losartan (Cozaar) 50 mg tablet    Other Relevant Orders    Basic Metabolic Panel    Encounter for annual physical exam - Primary Z00.00     Declines vaccines.  She did not receive COVID vaccine.  Her last screening mammogram was in January this year.  Has seen gynecologist this year has had a normal screening Pap test.  Has had colonoscopy in February this year, found to have 1 adenomatous polyp, was recommended to return in 5 years.  Discussed weight loss follow low caloric diet avoid rice potatoes pasta sweets.  Increase physical activity.         Adult BMI 40.0-44.9 kg/sq m (CMS/[facility]) Z68.41     She declined bariatric surgery in the past.    Discussed  low caloric diet, exercise regularly.         Controlled type 2 diabetes mellitus without complication, without long-term current use of insulin (CMS/[facility]) E11.9     Fasting blood glucose 127.  Hemoglobin A1c done in the office 6.8.  Metformin 500 mg with dinner.  Discussed diet avoid rice potatoes pasta sweets sweet drinks.  Increase physical activity exercise regularly to lose weight.  She declined bariatric surgery in the past.         Relevant Medications    metFORMIN (Glucophage) 500 mg tablet    Other Relevant Orders    Hemoglobin A1C    Lipid Panel     Other Visit Diagnoses         Codes    Abnormal glucose     R73.09    Relevant Orders    POCT glycosylated hemoglobin (Hb A1C) manually resulted (Completed)     Cough, unspecified type     R05.9    Relevant Medications    albuterol (ProAir HFA) 90 mcg/actuation inhaler

## 2024-04-10 ENCOUNTER — TELEMEDICINE CLINICAL SUPPORT (OUTPATIENT)
Dept: GENETICS | Facility: CLINIC | Age: 54
End: 2024-04-10
Payer: COMMERCIAL

## 2024-04-10 ENCOUNTER — LAB (OUTPATIENT)
Dept: LAB | Facility: LAB | Age: 54
End: 2024-04-10
Payer: COMMERCIAL

## 2024-04-10 DIAGNOSIS — Z80.8 FAMILY HISTORY OF THYROID CANCER: ICD-10-CM

## 2024-04-10 DIAGNOSIS — Z80.3 FAMILY HISTORY OF BREAST CANCER: Primary | ICD-10-CM

## 2024-04-10 DIAGNOSIS — Z80.3 FAMILY HISTORY OF BREAST CANCER: ICD-10-CM

## 2024-04-10 PROCEDURE — 96040 PR MEDICAL GENETICS COUNSELING EACH 30 MINUTES: CPT

## 2024-04-10 PROCEDURE — 36415 COLL VENOUS BLD VENIPUNCTURE: CPT

## 2024-04-22 LAB — SCAN RESULT: NORMAL

## 2024-05-07 DIAGNOSIS — E55.9 VITAMIN D DEFICIENCY: ICD-10-CM

## 2024-05-07 RX ORDER — ERGOCALCIFEROL 1.25 MG/1
1 CAPSULE ORAL
Qty: 12 CAPSULE | Refills: 0 | Status: SHIPPED | OUTPATIENT
Start: 2024-05-07

## 2024-05-08 PROBLEM — D12.2 BENIGN NEOPLASM OF ASCENDING COLON: Status: ACTIVE | Noted: 2024-02-28

## 2024-05-08 PROBLEM — R73.09 ABNORMAL GLUCOSE LEVEL: Status: RESOLVED | Noted: 2024-05-08 | Resolved: 2024-05-08

## 2024-05-08 PROBLEM — Z00.00 ENCOUNTER FOR ANNUAL PHYSICAL EXAM: Status: RESOLVED | Noted: 2024-04-03 | Resolved: 2024-05-08

## 2024-05-30 ENCOUNTER — OFFICE VISIT (OUTPATIENT)
Dept: PRIMARY CARE | Facility: CLINIC | Age: 54
End: 2024-05-30
Payer: COMMERCIAL

## 2024-05-30 VITALS
DIASTOLIC BLOOD PRESSURE: 90 MMHG | WEIGHT: 274 LBS | BODY MASS INDEX: 44.04 KG/M2 | OXYGEN SATURATION: 99 % | RESPIRATION RATE: 18 BRPM | HEART RATE: 68 BPM | SYSTOLIC BLOOD PRESSURE: 160 MMHG

## 2024-05-30 DIAGNOSIS — I10 PRIMARY HYPERTENSION: ICD-10-CM

## 2024-05-30 DIAGNOSIS — R05.3 CHRONIC COUGH: Primary | ICD-10-CM

## 2024-05-30 PROCEDURE — 3049F LDL-C 100-129 MG/DL: CPT | Performed by: INTERNAL MEDICINE

## 2024-05-30 PROCEDURE — 3080F DIAST BP >= 90 MM HG: CPT | Performed by: INTERNAL MEDICINE

## 2024-05-30 PROCEDURE — 1036F TOBACCO NON-USER: CPT | Performed by: INTERNAL MEDICINE

## 2024-05-30 PROCEDURE — 99213 OFFICE O/P EST LOW 20 MIN: CPT | Performed by: INTERNAL MEDICINE

## 2024-05-30 PROCEDURE — 4010F ACE/ARB THERAPY RXD/TAKEN: CPT | Performed by: INTERNAL MEDICINE

## 2024-05-30 PROCEDURE — 3008F BODY MASS INDEX DOCD: CPT | Performed by: INTERNAL MEDICINE

## 2024-05-30 PROCEDURE — 3077F SYST BP >= 140 MM HG: CPT | Performed by: INTERNAL MEDICINE

## 2024-05-30 ASSESSMENT — PATIENT HEALTH QUESTIONNAIRE - PHQ9
2. FEELING DOWN, DEPRESSED OR HOPELESS: NOT AT ALL
SUM OF ALL RESPONSES TO PHQ9 QUESTIONS 1 AND 2: 0
1. LITTLE INTEREST OR PLEASURE IN DOING THINGS: NOT AT ALL

## 2024-05-30 ASSESSMENT — ENCOUNTER SYMPTOMS
DEPRESSION: 0
OCCASIONAL FEELINGS OF UNSTEADINESS: 0
COUGH: 1
LOSS OF SENSATION IN FEET: 0

## 2024-05-30 NOTE — LETTER
May 30, 2024     Patient: Larisa Villagomez   YOB: 1970   Date of Visit: 5/30/2024       To Whom It May Concern:    It is my medical recommendation that Larisa Villagomez should work from home five days a week.     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     If you have any questions or concerns, please don't hesitate to call.         Sincerely,        Carole Ferrara MD    CC: No Recipients

## 2024-05-30 NOTE — PROGRESS NOTES
Subjective   Patient ID: Larisa Villagomez is a 53 y.o. female who presents for Letter for School/Work.    Follow-up visit.  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  She feels scratchy throat constant cough for the next 4 days.  She uses albuterol inhaler to relieve her symptoms.  She tells me the building has air conditioning with circulating air ,no fresh air ,there is lead in the  water not allowed to wash their hands if they have a cat or a small wound.  The drinking fountain's are covered up.  She would like to work from home 5 days a week to avoid upper respiratory symptoms.         Review of Systems   Respiratory:  Positive for cough.        Objective   /90 (BP Location: Left arm, Patient Position: Sitting)   Pulse 68   Resp 18   Wt 124 kg (274 lb)   SpO2 99%   BMI 44.04 kg/m²     Physical Exam  Constitutional:       Appearance: Normal appearance. She is obese.   HENT:      Head: Normocephalic and atraumatic.      Right Ear: External ear normal.      Left Ear: External ear normal.      Mouth/Throat:      Mouth: Mucous membranes are moist.      Pharynx: Oropharynx is clear.   Neck:      Vascular: No carotid bruit.   Cardiovascular:      Rate and Rhythm: Normal rate and regular rhythm.      Heart sounds: No murmur heard.     No gallop.   Pulmonary:      Effort: Pulmonary effort is normal. No respiratory distress.      Breath sounds: No wheezing or rales.   Abdominal:      General: Abdomen is flat.      Palpations: Abdomen is soft.      Hernia: No hernia is present.   Musculoskeletal:         General: No swelling, tenderness, deformity or signs of injury.      Cervical back: No rigidity or tenderness.      Right lower leg: No edema.   Lymphadenopathy:      Cervical: No cervical adenopathy.   Skin:     Coloration: Skin is not jaundiced or pale.      Findings: No bruising, erythema, lesion or rash.   Neurological:      General:  No focal deficit present.      Mental Status: She is oriented to person, place, and time.      Motor: No weakness.      Coordination: Coordination normal.   Psychiatric:         Mood and Affect: Mood normal.         Behavior: Behavior normal.         Assessment/Plan   Problem List Items Addressed This Visit             ICD-10-CM    Chronic cough - Primary R05.3     Cough for few days after going to work Fridays.  Blames it on for air quality in the building.  Recommended to work 5 days a week from home         Primary hypertension I10     Elevated blood pressure today.  She tells me at home her readings are within normal limits.  Advised to check blood pressure at home daily for the next week call the office if readings are 140/90 or higher.  Follow low-sodium diet do not exceed 200 mg per serving.

## 2024-05-30 NOTE — ASSESSMENT & PLAN NOTE
Elevated blood pressure today.  She tells me at home her readings are within normal limits.  Advised to check blood pressure at home daily for the next week call the office if readings are 140/90 or higher.  Follow low-sodium diet do not exceed 200 mg per serving.

## 2024-05-30 NOTE — ASSESSMENT & PLAN NOTE
Cough for few days after going to work Fridays.  Blames it on for air quality in the building.  Recommended to work 5 days a week from home

## 2024-06-24 ENCOUNTER — APPOINTMENT (OUTPATIENT)
Dept: PRIMARY CARE | Facility: CLINIC | Age: 54
End: 2024-06-24
Payer: COMMERCIAL

## 2024-06-26 ENCOUNTER — TELEPHONE (OUTPATIENT)
Dept: GENETICS | Facility: CLINIC | Age: 54
End: 2024-06-26
Payer: COMMERCIAL

## 2024-07-03 ENCOUNTER — APPOINTMENT (OUTPATIENT)
Dept: PRIMARY CARE | Facility: CLINIC | Age: 54
End: 2024-07-03
Payer: COMMERCIAL

## 2024-07-15 NOTE — PROGRESS NOTES
- You will receive a copy of your genetic testing results in the mail.   - Below is a summary of what we discussed at your appointment.    - Larisa Villagomez is a 54 y.o. old female with a family history of breast cancer.  - Larisa Villagomez was initially seen in the [facility] on 4/10/24 for counseling and coordination of testing.  - Following that appointment, a blood sample was sent for genetic testing via 71-gene [facility]-Expanded panel from [facility].  - I called her to review the results of this testing, and our discussion is summarized below.    - Your genetic testing did not show any known pathogenic (known harmful, cancer-causing) mutations in any of the genes which were examined.   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     - One of the VUSs is located at p.P332A (c.994C>G) on one copy of her SDHA genes. The other is located at  p.V531M (c.1591G>A) on one copy of her SDHA genes. Please note that we do not know if these are on the same copy of SDHA (trans) or different ones (cis). The only way to know this would be by testing family members.  - As discussed, a VUS is a change in the gene from the typical expected result that is not known if it is:  ------------- (a) harmful and associated with increased cancer risk, or  ------------- (b) benign and not associated with increased cancer risk.   - Over time, as new knowledge is gained, a genetic testing laboratory may be able to reclassify the VUS as either benign or pathogenic.    - The reclassification process does not usually happen very quickly, and the process sometimes takes years.  - More often than not, a VUS is reclassified as benign (or likely benign), but sometimes a VUS is reclassified as pathogenic.  - A pathogenic gene change is one that would have significant implications for you as the original patient  "tested, as well as for your family members.   - At this time, since this gene change is a VUS, this means that we cannot make any medical management recommendations based on it.  - Please note that this gene is NOT associated with breast cancer and would not explain the family history   - We must use your personal and family history to guide your care and the care of your relatives.       - One reason Larisa Villagomez underwent genetic testing was to better understand her risk to develop breast cancer. Because no gene mutations were identified, her risk to develop breast cancer is based on her family history and personal risk factors. A [facility] risk model estimates her lifetime risk to develop breast cancer as 15-17 %, which is elevated over the general population risk of 9%.     - Today, we recommend that women whose lifetime risk to develop breast cancer is over 20% have annual mammograms and breast MRIs. Because her risk is not over 20%, Larisa Villagomze is recommended to have a mammogram every year.     - We discussed that annual breast MRIs are recommended for women whose breast cancer risk is over 20%. While she is not \"recommended\" to have breast MRIs, there are various self-pay options for rapid MRIs, if she is interested.     - It was previously discussed that women with BRCA1 and BRCA2 mutations have an increased risk for ovarian cancer.   - With negative test results and no family history of ovarian cancer, she is not considered at increased risk for this cancer type from a genetic standpoint.     - Although her results were negative, Ms. Villagomez may have a mildly increased risk for thyroid cancer given the family history alone. At this time, we do not have standard screening recommendations or clear risk estimates. Ms. Villagomez should discuss the family history with her medical providers sooner.    - You should also follow with your primary care providers for all other age-related cancer screenings, " such as Pap smears, colonoscopies, etc.    - Regarding your children, if there is no significant history of cancer on their father's side, no genetic testing is recommended for them at this time, since your test results did not show anything they need to be tested for.    - Our understanding of genetic contribution to cancer diagnoses is always evolving, so there may be additional testing recommended in the future.  - She can contact us in 3-5 years to determine if there have been any changes since our discussion today and to see if the VUSs in the SDHA gene has been reclassified.    - Please also keep us apprised as to any changes to your personal and/or family history of cancer.   -If you have any questions following your appointment today, please call me at [phone].    Marge Boswell MS, [facility] [place]  Certified Genetic Counselor  [facility]  [phone]    Reviewed by:  Dr. Karen Alcala  Clinical   [facility]  [phone]

## 2024-07-17 ENCOUNTER — APPOINTMENT (OUTPATIENT)
Dept: GENETICS | Facility: CLINIC | Age: 54
End: 2024-07-17
Payer: COMMERCIAL

## 2024-07-17 DIAGNOSIS — Z80.3 FAMILY HISTORY OF BREAST CANCER: ICD-10-CM

## 2024-07-17 DIAGNOSIS — Z80.8 FAMILY HISTORY OF THYROID CANCER: ICD-10-CM

## 2024-07-17 PROCEDURE — 96040 PR MEDICAL GENETICS COUNSELING EACH 30 MINUTES: CPT

## 2024-12-21 DIAGNOSIS — E55.9 VITAMIN D DEFICIENCY: ICD-10-CM

## 2024-12-23 RX ORDER — ERGOCALCIFEROL 1.25 MG/1
1 CAPSULE ORAL
Qty: 12 CAPSULE | Refills: 1 | Status: SHIPPED | OUTPATIENT
Start: 2024-12-29

## 2025-01-08 ENCOUNTER — APPOINTMENT (OUTPATIENT)
Dept: PRIMARY CARE | Facility: CLINIC | Age: 55
End: 2025-01-08
Payer: COMMERCIAL

## 2025-01-08 VITALS
WEIGHT: 277.12 LBS | HEART RATE: 81 BPM | SYSTOLIC BLOOD PRESSURE: 160 MMHG | DIASTOLIC BLOOD PRESSURE: 100 MMHG | OXYGEN SATURATION: 97 % | RESPIRATION RATE: 18 BRPM | BODY MASS INDEX: 44.54 KG/M2

## 2025-01-08 DIAGNOSIS — E11.9 CONTROLLED TYPE 2 DIABETES MELLITUS WITHOUT COMPLICATION, WITHOUT LONG-TERM CURRENT USE OF INSULIN (MULTI): ICD-10-CM

## 2025-01-08 DIAGNOSIS — I10 PRIMARY HYPERTENSION: Primary | ICD-10-CM

## 2025-01-08 DIAGNOSIS — R51.9 NONINTRACTABLE HEADACHE, UNSPECIFIED CHRONICITY PATTERN, UNSPECIFIED HEADACHE TYPE: ICD-10-CM

## 2025-01-08 DIAGNOSIS — R05.3 CHRONIC COUGH: ICD-10-CM

## 2025-01-08 DIAGNOSIS — Z12.31 ENCOUNTER FOR SCREENING MAMMOGRAM FOR BREAST CANCER: ICD-10-CM

## 2025-01-08 DIAGNOSIS — E55.9 VITAMIN D DEFICIENCY: ICD-10-CM

## 2025-01-08 DIAGNOSIS — Z00.00 ENCOUNTER FOR ANNUAL PHYSICAL EXAM: ICD-10-CM

## 2025-01-08 LAB
POC ALBUMIN /CREATININE RATIO MANUALLY ENTERED: ABNORMAL UG/MG CREAT
POC URINE ALBUMIN: 80 MG/L
POC URINE CREATININE: 200 MG/DL

## 2025-01-08 PROCEDURE — 99214 OFFICE O/P EST MOD 30 MIN: CPT | Performed by: INTERNAL MEDICINE

## 2025-01-08 PROCEDURE — 93000 ELECTROCARDIOGRAM COMPLETE: CPT | Performed by: INTERNAL MEDICINE

## 2025-01-08 PROCEDURE — 1036F TOBACCO NON-USER: CPT | Performed by: INTERNAL MEDICINE

## 2025-01-08 PROCEDURE — 3077F SYST BP >= 140 MM HG: CPT | Performed by: INTERNAL MEDICINE

## 2025-01-08 PROCEDURE — 3080F DIAST BP >= 90 MM HG: CPT | Performed by: INTERNAL MEDICINE

## 2025-01-08 PROCEDURE — 82044 UR ALBUMIN SEMIQUANTITATIVE: CPT | Performed by: INTERNAL MEDICINE

## 2025-01-08 RX ORDER — LOSARTAN POTASSIUM AND HYDROCHLOROTHIAZIDE 25; 100 MG/1; MG/1
1 TABLET ORAL DAILY
Qty: 90 TABLET | Refills: 1 | Status: SHIPPED | OUTPATIENT
Start: 2025-01-08 | End: 2025-07-07

## 2025-01-08 RX ORDER — LOSARTAN POTASSIUM AND HYDROCHLOROTHIAZIDE 12.5; 5 MG/1; MG/1
1 TABLET ORAL DAILY
Qty: 90 TABLET | Refills: 1 | Status: CANCELLED | OUTPATIENT
Start: 2025-01-08 | End: 2025-07-07

## 2025-01-08 RX ORDER — TOPIRAMATE 25 MG/1
TABLET ORAL
Qty: 67 TABLET | Refills: 0 | Status: SHIPPED | OUTPATIENT
Start: 2025-01-08 | End: 2025-02-14

## 2025-01-08 ASSESSMENT — PATIENT HEALTH QUESTIONNAIRE - PHQ9
SUM OF ALL RESPONSES TO PHQ9 QUESTIONS 1 AND 2: 0
1. LITTLE INTEREST OR PLEASURE IN DOING THINGS: NOT AT ALL
2. FEELING DOWN, DEPRESSED OR HOPELESS: NOT AT ALL

## 2025-01-08 ASSESSMENT — PAIN SCALES - GENERAL: PAINLEVEL_OUTOF10: 3

## 2025-01-08 ASSESSMENT — ENCOUNTER SYMPTOMS
OCCASIONAL FEELINGS OF UNSTEADINESS: 0
BACK PAIN: 1
HEADACHES: 1
COUGH: 1
DEPRESSION: 0
LOSS OF SENSATION IN FEET: 0
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
## 2025-01-08 NOTE — ASSESSMENT & PLAN NOTE
Diabetes: Type 2 - check  HbA1C - Educate sugar free and low carb diet.  Increase physical activity.  Educate Medication and diet compliance.  L for patient to schedule shotast HbA1c 6.8

## 2025-01-08 NOTE — ASSESSMENT & PLAN NOTE
High  blood pressure ,160/100 he stopped taking losartan.  She is willing to resume it.  Start losartan/HCTZ 100/25 mg daily.  Follow low-sodium diet do not exceed 200 mg per serving.  Increase physical activity to lose weight follow low caloric diet.
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
## 2025-01-29 ENCOUNTER — HOSPITAL ENCOUNTER (OUTPATIENT)
Dept: RADIOLOGY | Facility: CLINIC | Age: 55
Discharge: HOME | End: 2025-01-29
Payer: COMMERCIAL

## 2025-01-29 VITALS — BODY MASS INDEX: 44.52 KG/M2 | WEIGHT: 277 LBS | HEIGHT: 66 IN

## 2025-01-29 DIAGNOSIS — Z12.31 ENCOUNTER FOR SCREENING MAMMOGRAM FOR BREAST CANCER: ICD-10-CM

## 2025-01-29 PROCEDURE — 77067 SCR MAMMO BI INCL CAD: CPT | Performed by: RADIOLOGY

## 2025-01-29 PROCEDURE — 77067 SCR MAMMO BI INCL CAD: CPT

## 2025-01-29 PROCEDURE — 77063 BREAST TOMOSYNTHESIS BI: CPT | Performed by: RADIOLOGY

## 2025-02-12 DIAGNOSIS — R51.9 NONINTRACTABLE HEADACHE, UNSPECIFIED CHRONICITY PATTERN, UNSPECIFIED HEADACHE TYPE: ICD-10-CM

## 2025-02-12 RX ORDER — TOPIRAMATE 25 MG/1
25 TABLET ORAL 2 TIMES DAILY
Qty: 180 TABLET | Refills: 1 | Status: SHIPPED | OUTPATIENT
Start: 2025-02-12 | End: 2025-08-11

## 2025-03-17 ENCOUNTER — APPOINTMENT (OUTPATIENT)
Dept: PRIMARY CARE | Facility: CLINIC | Age: 55
End: 2025-03-17
Payer: COMMERCIAL

## 2025-03-17 VITALS
SYSTOLIC BLOOD PRESSURE: 142 MMHG | WEIGHT: 270 LBS | DIASTOLIC BLOOD PRESSURE: 80 MMHG | HEIGHT: 66 IN | BODY MASS INDEX: 43.39 KG/M2

## 2025-03-17 DIAGNOSIS — R05.3 CHRONIC COUGH: ICD-10-CM

## 2025-03-17 DIAGNOSIS — Z13.6 SCREENING FOR HEART DISEASE: ICD-10-CM

## 2025-03-17 PROCEDURE — 99203 OFFICE O/P NEW LOW 30 MIN: CPT | Performed by: INTERNAL MEDICINE

## 2025-03-17 PROCEDURE — 3079F DIAST BP 80-89 MM HG: CPT | Performed by: INTERNAL MEDICINE

## 2025-03-17 PROCEDURE — 3077F SYST BP >= 140 MM HG: CPT | Performed by: INTERNAL MEDICINE

## 2025-03-17 PROCEDURE — 3008F BODY MASS INDEX DOCD: CPT | Performed by: INTERNAL MEDICINE

## 2025-03-17 ASSESSMENT — ENCOUNTER SYMPTOMS
LOSS OF SENSATION IN FEET: 0
OCCASIONAL FEELINGS OF UNSTEADINESS: 0
DEPRESSION: 0

## 2025-03-17 NOTE — PROGRESS NOTES
"Subjective   Patient ID: Larisa Villagomez is a 54 y.o. female who presents for New Patient Visit.    HPI   New patient visit  She is here because of her problems with chronic cough.  Chronic cough causes incontinence  Cough happens only when she is in the [employer] building where she works  So far she was working from home and had no issues.  Since she started going to the building her symptoms are coming back and she is also noticing sometimes rash on the chest  She has mentioned this to her primary care physician.  And it is well-documented  She needs a note to say she has to work from home  Work is requiring the medical records    Review of Systems    Objective   /80   Ht 1.676 m (5' 6\")   Wt 122 kg (270 lb)   BMI 43.58 kg/m²     Physical Exam  Vitals reviewed.   Constitutional:       Appearance: Normal appearance.   HENT:      Head: Normocephalic and atraumatic.      Right Ear: Tympanic membrane, ear canal and external ear normal.      Left Ear: Tympanic membrane, ear canal and external ear normal.      Nose: Nose normal.      Mouth/Throat:      Pharynx: Oropharynx is clear.   Eyes:      Extraocular Movements: Extraocular movements intact.      Conjunctiva/sclera: Conjunctivae normal.      Pupils: Pupils are equal, round, and reactive to light.   Cardiovascular:      Rate and Rhythm: Normal rate and regular rhythm.      Pulses: Normal pulses.      Heart sounds: Normal heart sounds.   Pulmonary:      Effort: Pulmonary effort is normal.      Breath sounds: Normal breath sounds.   Abdominal:      General: Abdomen is flat. Bowel sounds are normal.      Palpations: Abdomen is soft.   Musculoskeletal:      Cervical back: Normal range of motion and neck supple.   Skin:     General: Skin is warm and dry.   Neurological:      General: No focal deficit present.      Mental Status: She is alert and oriented to person, place, and time.   Psychiatric:         Mood and Affect: Mood normal. 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

## 2025-03-20 ENCOUNTER — TELEPHONE (OUTPATIENT)
Dept: PRIMARY CARE | Facility: CLINIC | Age: 55
End: 2025-03-20
Payer: COMMERCIAL

## 2025-05-07 ENCOUNTER — APPOINTMENT (OUTPATIENT)
Dept: PRIMARY CARE | Facility: CLINIC | Age: 55
End: 2025-05-07
Payer: COMMERCIAL

## 2025-07-23 ENCOUNTER — APPOINTMENT (OUTPATIENT)
Dept: PRIMARY CARE | Facility: CLINIC | Age: 55
End: 2025-07-23
Payer: COMMERCIAL

## 2025-08-04 ENCOUNTER — APPOINTMENT (OUTPATIENT)
Dept: PRIMARY CARE | Facility: CLINIC | Age: 55
End: 2025-08-04

## 2025-08-07 PROCEDURE — 99285 EMERGENCY DEPT VISIT HI MDM: CPT | Performed by: EMERGENCY MEDICINE

## 2025-08-08 ENCOUNTER — ANESTHESIA EVENT (OUTPATIENT)
Dept: OPERATING ROOM | Facility: HOSPITAL | Age: 55
End: 2025-08-08
Payer: COMMERCIAL

## 2025-08-08 ENCOUNTER — APPOINTMENT (OUTPATIENT)
Dept: CARDIOLOGY | Facility: HOSPITAL | Age: 55
End: 2025-08-08
Payer: COMMERCIAL

## 2025-08-08 ENCOUNTER — HOSPITAL ENCOUNTER (EMERGENCY)
Facility: HOSPITAL | Age: 55
Discharge: HOME | End: 2025-08-08
Attending: EMERGENCY MEDICINE
Payer: COMMERCIAL

## 2025-08-08 ENCOUNTER — ANESTHESIA (OUTPATIENT)
Dept: OPERATING ROOM | Facility: HOSPITAL | Age: 55
End: 2025-08-08
Payer: COMMERCIAL

## 2025-08-08 ENCOUNTER — APPOINTMENT (OUTPATIENT)
Dept: RADIOLOGY | Facility: HOSPITAL | Age: 55
End: 2025-08-08
Payer: COMMERCIAL

## 2025-08-08 ENCOUNTER — HOSPITAL ENCOUNTER (OUTPATIENT)
Facility: HOSPITAL | Age: 55
Setting detail: OUTPATIENT SURGERY
End: 2025-08-08
Attending: OBSTETRICS & GYNECOLOGY | Admitting: OBSTETRICS & GYNECOLOGY
Payer: COMMERCIAL

## 2025-08-08 ENCOUNTER — PREP FOR PROCEDURE (OUTPATIENT)
Dept: OBSTETRICS AND GYNECOLOGY | Facility: CLINIC | Age: 55
End: 2025-08-08

## 2025-08-08 VITALS
BODY MASS INDEX: 42.27 KG/M2 | TEMPERATURE: 97.2 F | HEIGHT: 66 IN | OXYGEN SATURATION: 100 % | WEIGHT: 263 LBS | SYSTOLIC BLOOD PRESSURE: 147 MMHG | HEART RATE: 72 BPM | RESPIRATION RATE: 14 BRPM | DIASTOLIC BLOOD PRESSURE: 88 MMHG

## 2025-08-08 DIAGNOSIS — N92.4 EXCESSIVE BLEEDING IN PREMENOPAUSAL PERIOD: Primary | ICD-10-CM

## 2025-08-08 DIAGNOSIS — D62 ACUTE BLOOD LOSS ANEMIA: ICD-10-CM

## 2025-08-08 DIAGNOSIS — N93.9 VAGINAL BLEEDING, ABNORMAL: ICD-10-CM

## 2025-08-08 LAB
ABO GROUP (TYPE) IN BLOOD: NORMAL
ANION GAP SERPL CALC-SCNC: 16 MMOL/L (ref 10–20)
ANTIBODY SCREEN: NORMAL
APTT PPP: 30 SECONDS (ref 26–36)
ATRIAL RATE: 84 BPM
B-HCG SERPL-ACNC: <2 MIU/ML
BASOPHILS # BLD AUTO: 0.02 X10*3/UL (ref 0–0.1)
BASOPHILS NFR BLD AUTO: 0.2 %
BUN SERPL-MCNC: 9 MG/DL (ref 6–23)
CALCIUM SERPL-MCNC: 8.5 MG/DL (ref 8.6–10.3)
CHLORIDE SERPL-SCNC: 105 MMOL/L (ref 98–107)
CO2 SERPL-SCNC: 21 MMOL/L (ref 21–32)
CREAT SERPL-MCNC: 0.99 MG/DL (ref 0.5–1.05)
EGFRCR SERPLBLD CKD-EPI 2021: 68 ML/MIN/1.73M*2
EOSINOPHIL # BLD AUTO: 0.01 X10*3/UL (ref 0–0.7)
EOSINOPHIL NFR BLD AUTO: 0.1 %
ERYTHROCYTE [DISTWIDTH] IN BLOOD BY AUTOMATED COUNT: 13.8 % (ref 11.5–14.5)
ERYTHROCYTE [DISTWIDTH] IN BLOOD BY AUTOMATED COUNT: 13.9 % (ref 11.5–14.5)
GLUCOSE SERPL-MCNC: 149 MG/DL (ref 74–99)
HCT VFR BLD AUTO: 30.8 % (ref 36–46)
HCT VFR BLD AUTO: 37 % (ref 36–46)
HGB BLD-MCNC: 10.4 G/DL (ref 12–16)
HGB BLD-MCNC: 12.7 G/DL (ref 12–16)
IMM GRANULOCYTES # BLD AUTO: 0.04 X10*3/UL (ref 0–0.7)
IMM GRANULOCYTES NFR BLD AUTO: 0.4 % (ref 0–0.9)
INR PPP: 1.1 (ref 0.9–1.1)
LYMPHOCYTES # BLD AUTO: 1 X10*3/UL (ref 1.2–4.8)
LYMPHOCYTES NFR BLD AUTO: 10.1 %
MCH RBC QN AUTO: 30.1 PG (ref 26–34)
MCH RBC QN AUTO: 30.2 PG (ref 26–34)
MCHC RBC AUTO-ENTMCNC: 33.8 G/DL (ref 32–36)
MCHC RBC AUTO-ENTMCNC: 34.3 G/DL (ref 32–36)
MCV RBC AUTO: 88 FL (ref 80–100)
MCV RBC AUTO: 89 FL (ref 80–100)
MONOCYTES # BLD AUTO: 0.48 X10*3/UL (ref 0.1–1)
MONOCYTES NFR BLD AUTO: 4.9 %
NEUTROPHILS # BLD AUTO: 8.32 X10*3/UL (ref 1.2–7.7)
NEUTROPHILS NFR BLD AUTO: 84.3 %
NRBC BLD-RTO: 0 /100 WBCS (ref 0–0)
NRBC BLD-RTO: 0 /100 WBCS (ref 0–0)
P AXIS: 50 DEGREES
P OFFSET: 181 MS
P ONSET: 131 MS
PLATELET # BLD AUTO: 152 X10*3/UL (ref 150–450)
PLATELET # BLD AUTO: 187 X10*3/UL (ref 150–450)
POTASSIUM SERPL-SCNC: 3.7 MMOL/L (ref 3.5–5.3)
PR INTERVAL: 186 MS
PROTHROMBIN TIME: 12.5 SECONDS (ref 9.8–12.4)
Q ONSET: 224 MS
QRS COUNT: 14 BEATS
QRS DURATION: 88 MS
QT INTERVAL: 408 MS
QTC CALCULATION(BAZETT): 482 MS
QTC FREDERICIA: 456 MS
R AXIS: 21 DEGREES
RBC # BLD AUTO: 3.45 X10*6/UL (ref 4–5.2)
RBC # BLD AUTO: 4.2 X10*6/UL (ref 4–5.2)
RH FACTOR (ANTIGEN D): NORMAL
SODIUM SERPL-SCNC: 138 MMOL/L (ref 136–145)
T AXIS: 53 DEGREES
T OFFSET: 428 MS
VENTRICULAR RATE: 84 BPM
WBC # BLD AUTO: 10.8 X10*3/UL (ref 4.4–11.3)
WBC # BLD AUTO: 9.9 X10*3/UL (ref 4.4–11.3)

## 2025-08-08 PROCEDURE — 2500000005 HC RX 250 GENERAL PHARMACY W/O HCPCS: Performed by: STUDENT IN AN ORGANIZED HEALTH CARE EDUCATION/TRAINING PROGRAM

## 2025-08-08 PROCEDURE — 2720000007 HC OR 272 NO HCPCS: Performed by: STUDENT IN AN ORGANIZED HEALTH CARE EDUCATION/TRAINING PROGRAM

## 2025-08-08 PROCEDURE — 76856 US EXAM PELVIC COMPLETE: CPT

## 2025-08-08 PROCEDURE — 80048 BASIC METABOLIC PNL TOTAL CA: CPT | Performed by: EMERGENCY MEDICINE

## 2025-08-08 PROCEDURE — 36415 COLL VENOUS BLD VENIPUNCTURE: CPT | Performed by: EMERGENCY MEDICINE

## 2025-08-08 PROCEDURE — 93005 ELECTROCARDIOGRAM TRACING: CPT

## 2025-08-08 PROCEDURE — 84702 CHORIONIC GONADOTROPIN TEST: CPT | Performed by: EMERGENCY MEDICINE

## 2025-08-08 PROCEDURE — 85730 THROMBOPLASTIN TIME PARTIAL: CPT | Performed by: EMERGENCY MEDICINE

## 2025-08-08 PROCEDURE — 3600000008 HC OR TIME - EACH INCREMENTAL 1 MINUTE - PROCEDURE LEVEL THREE: Performed by: STUDENT IN AN ORGANIZED HEALTH CARE EDUCATION/TRAINING PROGRAM

## 2025-08-08 PROCEDURE — 85027 COMPLETE CBC AUTOMATED: CPT | Performed by: EMERGENCY MEDICINE

## 2025-08-08 PROCEDURE — 3600000003 HC OR TIME - INITIAL BASE CHARGE - PROCEDURE LEVEL THREE: Performed by: STUDENT IN AN ORGANIZED HEALTH CARE EDUCATION/TRAINING PROGRAM

## 2025-08-08 PROCEDURE — 3700000001 HC GENERAL ANESTHESIA TIME - INITIAL BASE CHARGE: Performed by: STUDENT IN AN ORGANIZED HEALTH CARE EDUCATION/TRAINING PROGRAM

## 2025-08-08 PROCEDURE — 96374 THER/PROPH/DIAG INJ IV PUSH: CPT

## 2025-08-08 PROCEDURE — 7100000009 HC PHASE TWO TIME - INITIAL BASE CHARGE: Performed by: STUDENT IN AN ORGANIZED HEALTH CARE EDUCATION/TRAINING PROGRAM

## 2025-08-08 PROCEDURE — 88305 TISSUE EXAM BY PATHOLOGIST: CPT | Mod: TC,AHULAB | Performed by: OBSTETRICS & GYNECOLOGY

## 2025-08-08 PROCEDURE — 76857 US EXAM PELVIC LIMITED: CPT | Mod: FOREIGN READ | Performed by: RADIOLOGY

## 2025-08-08 PROCEDURE — 2500000004 HC RX 250 GENERAL PHARMACY W/ HCPCS (ALT 636 FOR OP/ED): Performed by: ANESTHESIOLOGIST ASSISTANT

## 2025-08-08 PROCEDURE — 7100000001 HC RECOVERY ROOM TIME - INITIAL BASE CHARGE: Performed by: STUDENT IN AN ORGANIZED HEALTH CARE EDUCATION/TRAINING PROGRAM

## 2025-08-08 PROCEDURE — 85610 PROTHROMBIN TIME: CPT | Performed by: EMERGENCY MEDICINE

## 2025-08-08 PROCEDURE — 58120 DILATION AND CURETTAGE: CPT | Performed by: STUDENT IN AN ORGANIZED HEALTH CARE EDUCATION/TRAINING PROGRAM

## 2025-08-08 PROCEDURE — 2500000001 HC RX 250 WO HCPCS SELF ADMINISTERED DRUGS (ALT 637 FOR MEDICARE OP): Performed by: OBSTETRICS & GYNECOLOGY

## 2025-08-08 PROCEDURE — 3700000002 HC GENERAL ANESTHESIA TIME - EACH INCREMENTAL 1 MINUTE: Performed by: STUDENT IN AN ORGANIZED HEALTH CARE EDUCATION/TRAINING PROGRAM

## 2025-08-08 PROCEDURE — 2500000005 HC RX 250 GENERAL PHARMACY W/O HCPCS: Performed by: EMERGENCY MEDICINE

## 2025-08-08 PROCEDURE — 76830 TRANSVAGINAL US NON-OB: CPT

## 2025-08-08 PROCEDURE — 99222 1ST HOSP IP/OBS MODERATE 55: CPT | Performed by: STUDENT IN AN ORGANIZED HEALTH CARE EDUCATION/TRAINING PROGRAM

## 2025-08-08 PROCEDURE — 2500000005 HC RX 250 GENERAL PHARMACY W/O HCPCS: Performed by: ANESTHESIOLOGIST ASSISTANT

## 2025-08-08 PROCEDURE — 2500000004 HC RX 250 GENERAL PHARMACY W/ HCPCS (ALT 636 FOR OP/ED): Performed by: EMERGENCY MEDICINE

## 2025-08-08 PROCEDURE — 85025 COMPLETE CBC W/AUTO DIFF WBC: CPT | Performed by: EMERGENCY MEDICINE

## 2025-08-08 PROCEDURE — 96375 TX/PRO/DX INJ NEW DRUG ADDON: CPT

## 2025-08-08 PROCEDURE — 86901 BLOOD TYPING SEROLOGIC RH(D): CPT | Performed by: EMERGENCY MEDICINE

## 2025-08-08 PROCEDURE — 2500000004 HC RX 250 GENERAL PHARMACY W/ HCPCS (ALT 636 FOR OP/ED): Performed by: OBSTETRICS & GYNECOLOGY

## 2025-08-08 PROCEDURE — 76830 TRANSVAGINAL US NON-OB: CPT | Mod: FOREIGN READ | Performed by: RADIOLOGY

## 2025-08-08 PROCEDURE — 7100000010 HC PHASE TWO TIME - EACH INCREMENTAL 1 MINUTE: Performed by: STUDENT IN AN ORGANIZED HEALTH CARE EDUCATION/TRAINING PROGRAM

## 2025-08-08 PROCEDURE — 7100000002 HC RECOVERY ROOM TIME - EACH INCREMENTAL 1 MINUTE: Performed by: STUDENT IN AN ORGANIZED HEALTH CARE EDUCATION/TRAINING PROGRAM

## 2025-08-08 RX ORDER — SODIUM CHLORIDE, SODIUM LACTATE, POTASSIUM CHLORIDE, CALCIUM CHLORIDE 600; 310; 30; 20 MG/100ML; MG/100ML; MG/100ML; MG/100ML
20 INJECTION, SOLUTION INTRAVENOUS CONTINUOUS
Status: ACTIVE | OUTPATIENT
Start: 2025-08-08 | End: 2025-08-08

## 2025-08-08 RX ORDER — LABETALOL HYDROCHLORIDE 5 MG/ML
5 INJECTION, SOLUTION INTRAVENOUS ONCE AS NEEDED
Status: DISCONTINUED | OUTPATIENT
Start: 2025-08-08 | End: 2025-08-08 | Stop reason: HOSPADM

## 2025-08-08 RX ORDER — SODIUM CHLORIDE 0.9 G/100ML
INJECTION, SOLUTION IRRIGATION AS NEEDED
Status: DISCONTINUED | OUTPATIENT
Start: 2025-08-08 | End: 2025-08-08 | Stop reason: HOSPADM

## 2025-08-08 RX ORDER — KETOROLAC TROMETHAMINE 30 MG/ML
INJECTION, SOLUTION INTRAMUSCULAR; INTRAVENOUS AS NEEDED
Status: DISCONTINUED | OUTPATIENT
Start: 2025-08-08 | End: 2025-08-08

## 2025-08-08 RX ORDER — CELECOXIB 400 MG/1
400 CAPSULE ORAL ONCE
Status: CANCELLED | OUTPATIENT
Start: 2025-08-08 | End: 2025-08-08

## 2025-08-08 RX ORDER — ONDANSETRON HYDROCHLORIDE 2 MG/ML
4 INJECTION, SOLUTION INTRAVENOUS ONCE AS NEEDED
Status: DISCONTINUED | OUTPATIENT
Start: 2025-08-08 | End: 2025-08-08 | Stop reason: HOSPADM

## 2025-08-08 RX ORDER — SODIUM CHLORIDE, SODIUM LACTATE, POTASSIUM CHLORIDE, CALCIUM CHLORIDE 600; 310; 30; 20 MG/100ML; MG/100ML; MG/100ML; MG/100ML
100 INJECTION, SOLUTION INTRAVENOUS CONTINUOUS
Status: DISCONTINUED | OUTPATIENT
Start: 2025-08-08 | End: 2025-08-08 | Stop reason: HOSPADM

## 2025-08-08 RX ORDER — ACETAMINOPHEN 325 MG/1
975 TABLET ORAL ONCE
Status: COMPLETED | OUTPATIENT
Start: 2025-08-08 | End: 2025-08-08

## 2025-08-08 RX ORDER — SODIUM CHLORIDE, SODIUM LACTATE, POTASSIUM CHLORIDE, CALCIUM CHLORIDE 600; 310; 30; 20 MG/100ML; MG/100ML; MG/100ML; MG/100ML
20 INJECTION, SOLUTION INTRAVENOUS CONTINUOUS
Status: CANCELLED | OUTPATIENT
Start: 2025-08-08 | End: 2025-08-08

## 2025-08-08 RX ORDER — DROPERIDOL 2.5 MG/ML
1.25 INJECTION, SOLUTION INTRAMUSCULAR; INTRAVENOUS ONCE
Status: COMPLETED | OUTPATIENT
Start: 2025-08-08 | End: 2025-08-08

## 2025-08-08 RX ORDER — GABAPENTIN 600 MG/1
600 TABLET ORAL ONCE
Status: CANCELLED | OUTPATIENT
Start: 2025-08-08 | End: 2025-08-08

## 2025-08-08 RX ORDER — IBUPROFEN 600 MG/1
600 TABLET, FILM COATED ORAL EVERY 6 HOURS PRN
Qty: 56 TABLET | Refills: 0 | Status: SHIPPED | OUTPATIENT
Start: 2025-08-08 | End: 2025-08-22

## 2025-08-08 RX ORDER — ACETAMINOPHEN 325 MG/1
975 TABLET ORAL ONCE
Status: CANCELLED | OUTPATIENT
Start: 2025-08-08 | End: 2025-08-08

## 2025-08-08 RX ORDER — LIDOCAINE HYDROCHLORIDE 20 MG/ML
INJECTION, SOLUTION INFILTRATION; PERINEURAL AS NEEDED
Status: DISCONTINUED | OUTPATIENT
Start: 2025-08-08 | End: 2025-08-08

## 2025-08-08 RX ORDER — PROPOFOL 10 MG/ML
INJECTION, EMULSION INTRAVENOUS AS NEEDED
Status: DISCONTINUED | OUTPATIENT
Start: 2025-08-08 | End: 2025-08-08

## 2025-08-08 RX ORDER — GABAPENTIN 300 MG/1
600 CAPSULE ORAL ONCE
Status: COMPLETED | OUTPATIENT
Start: 2025-08-08 | End: 2025-08-08

## 2025-08-08 RX ORDER — OXYCODONE HYDROCHLORIDE 5 MG/1
5 TABLET ORAL EVERY 4 HOURS PRN
Status: DISCONTINUED | OUTPATIENT
Start: 2025-08-08 | End: 2025-08-08 | Stop reason: HOSPADM

## 2025-08-08 RX ORDER — FENTANYL CITRATE 50 UG/ML
INJECTION, SOLUTION INTRAMUSCULAR; INTRAVENOUS AS NEEDED
Status: DISCONTINUED | OUTPATIENT
Start: 2025-08-08 | End: 2025-08-08

## 2025-08-08 RX ORDER — LIDOCAINE HYDROCHLORIDE 10 MG/ML
0.1 INJECTION, SOLUTION EPIDURAL; INFILTRATION; INTRACAUDAL; PERINEURAL ONCE
Status: DISCONTINUED | OUTPATIENT
Start: 2025-08-08 | End: 2025-08-08 | Stop reason: HOSPADM

## 2025-08-08 RX ORDER — TRANEXAMIC ACID 1 G/10ML
1000 INJECTION, SOLUTION INTRAVENOUS ONCE
Status: COMPLETED | OUTPATIENT
Start: 2025-08-08 | End: 2025-08-08

## 2025-08-08 RX ORDER — ONDANSETRON HYDROCHLORIDE 2 MG/ML
INJECTION, SOLUTION INTRAVENOUS AS NEEDED
Status: DISCONTINUED | OUTPATIENT
Start: 2025-08-08 | End: 2025-08-08

## 2025-08-08 RX ORDER — DIPHENHYDRAMINE HYDROCHLORIDE 50 MG/ML
12.5 INJECTION, SOLUTION INTRAMUSCULAR; INTRAVENOUS ONCE AS NEEDED
Status: DISCONTINUED | OUTPATIENT
Start: 2025-08-08 | End: 2025-08-08 | Stop reason: HOSPADM

## 2025-08-08 RX ORDER — MORPHINE SULFATE 4 MG/ML
4 INJECTION, SOLUTION INTRAMUSCULAR; INTRAVENOUS ONCE
Status: COMPLETED | OUTPATIENT
Start: 2025-08-08 | End: 2025-08-08

## 2025-08-08 RX ORDER — ACETAMINOPHEN 325 MG/1
1000 TABLET ORAL EVERY 6 HOURS PRN
Qty: 168 TABLET | Refills: 0 | Status: SHIPPED | OUTPATIENT
Start: 2025-08-08 | End: 2025-08-22

## 2025-08-08 RX ORDER — MIDAZOLAM HYDROCHLORIDE 2 MG/2ML
INJECTION, SOLUTION INTRAMUSCULAR; INTRAVENOUS AS NEEDED
Status: DISCONTINUED | OUTPATIENT
Start: 2025-08-08 | End: 2025-08-08

## 2025-08-08 RX ORDER — CELECOXIB 200 MG/1
400 CAPSULE ORAL ONCE
Status: COMPLETED | OUTPATIENT
Start: 2025-08-08 | End: 2025-08-08

## 2025-08-08 RX ADMIN — MIDAZOLAM HYDROCHLORIDE 2 MG: 1 INJECTION, SOLUTION INTRAMUSCULAR; INTRAVENOUS at 12:19

## 2025-08-08 RX ADMIN — KETOROLAC TROMETHAMINE 30 MG: 30 INJECTION, SOLUTION INTRAMUSCULAR at 12:50

## 2025-08-08 RX ADMIN — ONDANSETRON 4 MG: 2 INJECTION, SOLUTION INTRAMUSCULAR; INTRAVENOUS at 12:33

## 2025-08-08 RX ADMIN — LIDOCAINE HYDROCHLORIDE 100 MG: 20 INJECTION, SOLUTION INFILTRATION; PERINEURAL at 12:21

## 2025-08-08 RX ADMIN — TRANEXAMIC ACID 1000 MG: 1 INJECTION, SOLUTION INTRAVENOUS at 06:54

## 2025-08-08 RX ADMIN — CARBOXYMETHYLCELLULOSE SODIUM 2 DROP: 5 SOLUTION/ DROPS OPHTHALMIC at 12:21

## 2025-08-08 RX ADMIN — DROPERIDOL 1.25 MG: 2.5 INJECTION, SOLUTION INTRAMUSCULAR; INTRAVENOUS at 03:01

## 2025-08-08 RX ADMIN — FENTANYL CITRATE 25 MCG: 50 INJECTION, SOLUTION INTRAMUSCULAR; INTRAVENOUS at 12:50

## 2025-08-08 RX ADMIN — ACETAMINOPHEN 975 MG: 325 TABLET ORAL at 12:11

## 2025-08-08 RX ADMIN — Medication: at 13:00

## 2025-08-08 RX ADMIN — GABAPENTIN 600 MG: 300 CAPSULE ORAL at 12:11

## 2025-08-08 RX ADMIN — SODIUM CHLORIDE, POTASSIUM CHLORIDE, SODIUM LACTATE AND CALCIUM CHLORIDE: 600; 310; 30; 20 INJECTION, SOLUTION INTRAVENOUS at 12:19

## 2025-08-08 RX ADMIN — PROPOFOL 200 MG: 10 INJECTION, EMULSION INTRAVENOUS at 12:21

## 2025-08-08 RX ADMIN — CELECOXIB 400 MG: 200 CAPSULE ORAL at 12:11

## 2025-08-08 RX ADMIN — FENTANYL CITRATE 75 MCG: 50 INJECTION, SOLUTION INTRAMUSCULAR; INTRAVENOUS at 12:27

## 2025-08-08 RX ADMIN — DEXAMETHASONE SODIUM PHOSPHATE 8 MG: 4 INJECTION, SOLUTION INTRAMUSCULAR; INTRAVENOUS at 12:30

## 2025-08-08 RX ADMIN — MORPHINE SULFATE 4 MG: 4 INJECTION, SOLUTION INTRAMUSCULAR; INTRAVENOUS at 03:01

## 2025-08-08 ASSESSMENT — PAIN DESCRIPTION - LOCATION
LOCATION: ABDOMEN
LOCATION: ABDOMEN

## 2025-08-08 ASSESSMENT — PAIN - FUNCTIONAL ASSESSMENT
PAIN_FUNCTIONAL_ASSESSMENT: 0-10
PAIN_FUNCTIONAL_ASSESSMENT: VAS (VISUAL ANALOG SCALE)
PAIN_FUNCTIONAL_ASSESSMENT: 0-10

## 2025-08-08 ASSESSMENT — PAIN SCALES - GENERAL
PAINLEVEL_OUTOF10: 0 - NO PAIN
PAINLEVEL_OUTOF10: 0 - NO PAIN
PAINLEVEL_OUTOF10: 2
PAINLEVEL_OUTOF10: 0 - NO PAIN
PAINLEVEL_OUTOF10: 0 - NO PAIN
PAINLEVEL_OUTOF10: 10 - WORST POSSIBLE PAIN
PAINLEVEL_OUTOF10: 0 - NO PAIN

## 2025-08-08 ASSESSMENT — COLUMBIA-SUICIDE SEVERITY RATING SCALE - C-SSRS
2. HAVE YOU ACTUALLY HAD ANY THOUGHTS OF KILLING YOURSELF?: NO
6. HAVE YOU EVER DONE ANYTHING, STARTED TO DO ANYTHING, OR PREPARED TO DO ANYTHING TO END YOUR LIFE?: NO
1. IN THE PAST MONTH, HAVE YOU WISHED YOU WERE DEAD OR WISHED YOU COULD GO TO SLEEP AND NOT WAKE UP?: NO

## 2025-08-08 ASSESSMENT — PAIN DESCRIPTION - ORIENTATION: ORIENTATION: LOWER

## 2025-08-08 ASSESSMENT — PAIN DESCRIPTION - DESCRIPTORS: DESCRIPTORS: CRAMPING

## 2025-08-08 NOTE — PROGRESS NOTES
Emergency Medicine Transition of Care Note.    I received Larisa Villagomez in signout from Dr. King phipps.  Please see the previous ED provider note for all HPI, PE and MDM up to the time of signout at 6:00. This is in addition to the primary record.    In brief Larisa Villagomez is an 55 y.o. female presenting for   Chief Complaint   Patient presents with    Abdominal Cramping     55yF pmh of oopherectomy s/p ovarian torsion. Severe abdominal pain started yesterday. Worse today while working from home. No period x2 months. Excessive vaginal bleeding earlier with clots and none now. Pt a/ox4.      At the time of signout we were awaiting: Repeat evaluation.  I independently reviewed her ultrasound and there is a large amount of fluid in the cervix as well as endometrium.  I spoke with Dr. Brittanie resendiz and we discussed the possibility of needing a D&C given the amount of bleeding that she is having and the decrease in her hemoglobin.  I have ordered TXA to help with the excessive bleeding.  Patient reports she feels slightly lightheaded.  She was instructed to not eat or drink while we are awaiting OR.  I have ordered a second peripheral IV as well and a type and screen.    ED Course as of 08/08/25 1939   Fri Aug 08, 2025   0633 Irena James at 6:35 am   [JG]   0654 Pt NPO.  Erika will eval pt, possible OR. TXA given   [JG]      ED Course User Index  [JG] Emelia Tillman MD         Diagnoses as of 08/08/25 1939   Vaginal bleeding, abnormal   Acute blood loss anemia   Excessive bleeding in premenopausal period       Medical Decision Making      Final diagnoses:   [N93.9] Vaginal bleeding, abnormal   [D62] Acute blood loss anemia   [N92.4] Excessive bleeding in premenopausal period           Procedure  Procedures    Emelia Tillman MD

## 2025-08-08 NOTE — ED TRIAGE NOTES
55yF pmh of oopherectomy s/p ovarian torsion. Severe abdominal pain started yesterday. Worse today while working from home. No period x2 months. Excessive vaginal bleeding earlier with clots and none now. Pt a/ox4.

## 2025-08-08 NOTE — ANESTHESIA PREPROCEDURE EVALUATION
"Patient: Larisa Villagomez \"Larisa LANG\"    Procedure Information       Date/Time: 08/08/25 1000    Procedure: HYSTEROSCOPY, WITH DILATION AND CURETTAGE OF UTERUS    Location: [facility] / [facility]    Surgeons: Francoise James MD          History Comments   Torsion of ovary and ovarian pedicle, unspecified side Ovarian torsion   Acquired absence of ovaries, unilateral History of oophorectomy, unilateral   Pelvic and perineal pain Pelvic pain   Motion sickness    Pelvic pain in female    Cough    Intermittent upper abdominal pain    History of pica    Abdominal left upper quadrant tenderness    Weight gain    History of surgical procedure        Relevant Problems   Cardiac   (+) Primary hypertension      Endocrine   (+) Class 3 severe obesity in adult   (+) Controlled type 2 diabetes mellitus without complication, without long-term current use of insulin      Hematology   (+) Anemia, iron deficiency      Musculoskeletal   (+) Osteoarthritis of spine with radiculopathy, cervical region      GYN   (+) Excessive bleeding in premenopausal period   (+) PCOS (polycystic ovarian syndrome)       Clinical information reviewed:   Tobacco  Allergies  Meds   Med Hx  Surg Hx   Fam Hx  Soc Hx        NPO Detail:  No data recorded     Physical Exam    Airway  Mallampati: II  TM distance: >3 FB  Neck ROM: full     Cardiovascular   Rhythm: regular  Rate: normal     Dental    Pulmonary Breath sounds clear to auscultation     Abdominal            Anesthesia Plan    History of general anesthesia?: yes  History of complications of general anesthesia?: no    ASA 2 - emergent     general     intravenous induction   Anesthetic plan and risks discussed with patient.    Plan discussed with CRNA.      "

## 2025-08-08 NOTE — CONSULTS
"Inpatient consult to Gynecology  Consult performed by: Eliazar Monet MD  Consult ordered by: Emelia Tillman MD  Reason for consult: acute AUB  Assessment/Recommendations: 55-year-old female patient presenting with acute AUB.  Hemoglobin noted to be 10.4.  Pelvic ultrasound notable for 17.5 x 9.9 cm fibroid uterus with 6.8 cm dominant fibroid and 10 mm EEC.  1 g of IV TXA was given in the ED.  Given the acuity of the bleeding decision was made to proceed to the operating room for dilation and curettage.  Procedural details and risks including pain, bleeding, infection, injury to nearby structures, reoperation were reviewed with the patient and her .  All questions asked and answered.  Will proceed with ambulatory D&C.          Reason For Consult  IP GYN HPI/REASON FOR CONSULT: AUB/Consult    History Of Present Illness  Larisa Villagomez \"Larisa LANG\" is a 55 y.o. female presenting with IP GYN HPI/REASON FOR CONSULT: AUB/Consult.     Past Medical History  She has a past medical history of Abdominal left upper quadrant tenderness (01/10/2024), Acquired absence of ovaries, unilateral (2018), Acute bronchitis, Asthma, Cough (01/10/2024), Dysfunctional uterine bleeding, Fibroid, History of pica (01/10/2024), History of surgical procedure (2024), Hypertension, Intermittent upper abdominal pain (01/10/2024), Migraines, Motion sickness (01/10/2024), Pelvic and perineal pain (2019), Pelvic pain in female (01/10/2024), Sickle cell trait, Sleep apnea, Torsion of ovary and ovarian pedicle, unspecified side (2018), and Weight gain (2010).    She has no past medical history of Personal history of irradiation.    Surgical History  She has a past surgical history that includes Other surgical history (Right, 2019); Cyst Removal (Right); Colonoscopy w/ biopsies and polypectomy; and [employer] tooth extraction.    OB History  OB History    Para Term  AB Living   4 3 " "3  1    SAB IAB Ectopic Multiple Live Births    1         # Outcome Date GA Lbr Bryan/2nd Weight Sex Type Anes PTL Lv   4 Term 1997           3 IAB 1996           2 Term 1994           1 Term 1990               Sexual History  Social History     Substance and Sexual Activity   Sexual Activity Yes    Partners: Male    Birth control/protection: None        Social History  She reports that she has quit smoking. Her smoking use included cigarettes. She has never used smokeless tobacco. She reports current alcohol use of about 1.0 standard drink of alcohol per week. She reports that she does not use drugs.    Family History  Family History[1]     Allergies  Codeine and Latex    Review of Systems     Physical Exam     Last Recorded Vitals  Blood pressure 121/76, pulse 78, temperature 36.8 °C (98.3 °F), temperature source Temporal, resp. rate 12, height 1.676 m (5' 6\"), weight 119 kg (263 lb), last menstrual period 05/01/2025, SpO2 95%.         Relevant Results       ECG 12 lead  Result Date: 8/8/2025  Normal sinus rhythm Nonspecific T wave abnormality QTcB >= 480 msec Abnormal ECG No previous ECGs available    US PELVIS TRANSABDOMINAL WITH TRANSVAGINAL  Result Date: 8/8/2025  STUDY: Pelvic Ultrasound; 8/8/2025 6:09 AM. INDICATION: Lower abdominal pain and vaginal bleeding for one day.  Perimenopausal last menstrual cycle approximately two months ago.  Additional History:  Right oophorectomy.  Salpingectomy. COMPARISON: None Available. ACCESSION NUMBER(S): [id number] ORDERING CLINICIAN: EWELINA JETT TECHNIQUE: Transabdominal and transvaginal ultrasonography of the pelvis with color Doppler. FINDINGS: The uterus measures 17.5 x 9.4 x 9.9 cm.  A 6.8 x 6.1 x 6.2 cm heterogeneous solid myometrial mass is statistically a fibroid. Within the cervix there is a complex fluid collection.  This is likely blood.  This collection measures 5.0 x 2.5 x 3.8 cm.  There is no internal vascular flow by color Doppler imaging. The " endometrium measures 1 cm in maximum thickness. The right ovary is surgically absent by history. The left ovary is not identified. No free fluid in the cul-de-sac.    1. 6.8 cm myometrial mass, presumed fibroid. 2. Complex fluid collection within the cervix is likely blood; this measures 5.0 x 2.5 x 3.8 cm. 3. The right ovary is surgically absent by history. The left ovary is not identified. Signed by Chivo Malone MD        Results for orders placed or performed during the hospital encounter of 08/08/25 (from the past 24 hours)   CBC   Result Value Ref Range    WBC 10.8 4.4 - 11.3 x10*3/uL    nRBC 0.0 0.0 - 0.0 /100 WBCs    RBC 4.20 4.00 - 5.20 x10*6/uL    Hemoglobin 12.7 12.0 - 16.0 g/dL    Hematocrit 37.0 36.0 - 46.0 %    MCV 88 80 - 100 fL    MCH 30.2 26.0 - 34.0 pg    MCHC 34.3 32.0 - 36.0 g/dL    RDW 13.9 11.5 - 14.5 %    Platelets 187 150 - 450 x10*3/uL   Basic metabolic panel   Result Value Ref Range    Glucose 149 (H) 74 - 99 mg/dL    Sodium 138 136 - 145 mmol/L    Potassium 3.7 3.5 - 5.3 mmol/L    Chloride 105 98 - 107 mmol/L    Bicarbonate 21 21 - 32 mmol/L    Anion Gap 16 10 - 20 mmol/L    Urea Nitrogen 9 6 - 23 mg/dL    Creatinine 0.99 0.50 - 1.05 mg/dL    eGFR 68 >60 mL/min/1.73m*2    Calcium 8.5 (L) 8.6 - 10.3 mg/dL   Type and Screen   Result Value Ref Range    ABO TYPE O     Rh TYPE POS     ANTIBODY SCREEN NEG    Human Chorionic Gonadotropin, Serum Quantitative   Result Value Ref Range    HCG, Beta-Quantitative <2 <5 mIU/mL   ECG 12 lead   Result Value Ref Range    Ventricular Rate 84 BPM    Atrial Rate 84 BPM    OK Interval 186 ms    QRS Duration 88 ms    QT Interval 408 ms    QTC Calculation(Bazett) 482 ms    P Axis 50 degrees    R Axis 21 degrees    T Axis 53 degrees    QRS Count 14 beats    Q Onset 224 ms    P Onset 131 ms    P Offset 181 ms    T Offset 428 ms    QTC Fredericia 456 ms   Protime-INR   Result Value Ref Range    Protime 12.5 (H) 9.8 - 12.4 seconds    INR 1.1 0.9 - 1.1   APTT    Result Value Ref Range    aPTT 30 26 - 36 seconds   CBC and Auto Differential   Result Value Ref Range    WBC 9.9 4.4 - 11.3 x10*3/uL    nRBC 0.0 0.0 - 0.0 /100 WBCs    RBC 3.45 (L) 4.00 - 5.20 x10*6/uL    Hemoglobin 10.4 (L) 12.0 - 16.0 g/dL    Hematocrit 30.8 (L) 36.0 - 46.0 %    MCV 89 80 - 100 fL    MCH 30.1 26.0 - 34.0 pg    MCHC 33.8 32.0 - 36.0 g/dL    RDW 13.8 11.5 - 14.5 %    Platelets 152 150 - 450 x10*3/uL    Neutrophils % 84.3 40.0 - 80.0 %    Immature Granulocytes %, Automated 0.4 0.0 - 0.9 %    Lymphocytes % 10.1 13.0 - 44.0 %    Monocytes % 4.9 2.0 - 10.0 %    Eosinophils % 0.1 0.0 - 6.0 %    Basophils % 0.2 0.0 - 2.0 %    Neutrophils Absolute 8.32 (H) 1.20 - 7.70 x10*3/uL    Immature Granulocytes Absolute, Automated 0.04 0.00 - 0.70 x10*3/uL    Lymphocytes Absolute 1.00 (L) 1.20 - 4.80 x10*3/uL    Monocytes Absolute 0.48 0.10 - 1.00 x10*3/uL    Eosinophils Absolute 0.01 0.00 - 0.70 x10*3/uL    Basophils Absolute 0.02 0.00 - 0.10 x10*3/uL         Assessment/Plan     As above.    I spent 30 minutes in the professional and overall care of this patient.      Intimate Exam Performed: No, an intimate exam was not performed at this encounter.              [1]   Family History  Problem Relation Name Age of Onset    Breast cancer Mother  40        diagnosed at 40 and 62    Diabetes type II Father      Thyroid cancer Brother      Breast cancer Mother's Sister  52

## 2025-08-08 NOTE — ED PROVIDER NOTES
HPI   Chief Complaint   Patient presents with    Abdominal Cramping     55yF pmh of oopherectomy s/p ovarian torsion. Severe abdominal pain started yesterday. Worse today while working from home. No period x2 months. Excessive vaginal bleeding earlier with clots and none now. Pt a/ox4.        Chief complaint: Abdominal pain    History of present illness: Patient is a 55-year-old female with history of anemia obstructive sleep apnea PCOS ovarian teratoma presenting to the emergency department with complaints of abdominal discomfort.  According to the patient, she has been experiencing pain since 2 PM yesterday.  The patient states that she had a large amount of pressure in her lower abdomen.  The patient states that it felt like she needed to urinate but could not do so.  Patient states that she has had some vaginal bleeding recently.  The patient states that she was trying to reposition her uterus and in order to empty it however was unsuccessful.  As a result, the patient presents to the emergency department for further evaluation.  She denies ever having symptoms like this in the past.  She admits that she is perimenopausal currently.  She is established with a OB OB/GYN given her history of teratoma.  She denies use of any blood thinners.      History provided by:  Patient   used: No            Patient History   Medical History[1]  Surgical History[2]  Family History[3]  Social History[4]    Physical Exam   ED Triage Vitals [08/08/25 0002]   Temperature Heart Rate Respirations BP   36.8 °C (98.3 °F) (!) 112 20 (!) 133/91      Pulse Ox Temp Source Heart Rate Source Patient Position   98 % Temporal Monitor Sitting      BP Location FiO2 (%)     Left arm --       Physical Exam  Exam conducted with a chaperone present.   Constitutional:       Appearance: Normal appearance.   HENT:      Head: Normocephalic and atraumatic.      Right Ear: External ear normal.      Left Ear: External ear normal.       Nose: Nose normal.      Mouth/Throat:      Mouth: Mucous membranes are moist.     Eyes:      General: Lids are normal.      Extraocular Movements: Extraocular movements intact.      Pupils: Pupils are equal, round, and reactive to light.       Cardiovascular:      Rate and Rhythm: Normal rate and regular rhythm.      Heart sounds: Normal heart sounds.   Pulmonary:      Effort: Pulmonary effort is normal.      Breath sounds: Normal breath sounds.   Abdominal:      General: Abdomen is flat.      Palpations: Abdomen is soft.      Tenderness: There is abdominal tenderness in the suprapubic area. There is no guarding or rebound.   Genitourinary:     General: Normal vulva.      Exam position: Lithotomy position.      Vagina: Bleeding present.      Uterus: Tender.       Comments: Speculum examination demonstrates a copious amount of fresh blood in the patient's vaginal vault.  Despite the use of lollipop swabs, the cervix cannot be visualized.  The patient has uterine tenderness.  This appears to be active bleeding.    Musculoskeletal:         General: No deformity. Normal range of motion.      Cervical back: Normal range of motion and neck supple.     Skin:     General: Skin is warm.      Capillary Refill: Capillary refill takes less than 2 seconds.      Coloration: Skin is not jaundiced.     Neurological:      General: No focal deficit present.      Mental Status: She is alert and oriented to person, place, and time.     Psychiatric:         Mood and Affect: Mood normal.         Behavior: Behavior normal.           ED Course & MDM   ED Course as of 08/10/25 1926   Fri Aug 08, 2025   0633 Paging Erika at 6:35 am   [JG]   0654 Pt NPO.  Erika will eval pt, possible OR. TXA given   [JG]      ED Course User Index  [JG] Emelia Tillman MD         Diagnoses as of 08/10/25 1926   Vaginal bleeding, abnormal   Acute blood loss anemia   Excessive bleeding in premenopausal period                 No data recorded     [place]  Coma Scale Score: 15 (08/08/25 0003 : Norm Bhatt RN)                           Medical Decision Making  Medical decision making: On arrival to the emergency department, the patient was quite uncomfortable as result to give the patient both morphine and droperidol.  The patient's initial CBC demonstrated a hemoglobin of 12.7 and no other significant abnormalities Chem-7 was within normal limits.  Blood type is O+.    During her time in the emergency department, I was informed that the patient had a large gush of blood from her vagina.  The gush of blood soaked through the sheets in the bed and a large pool of blood was found at the patient's bedside.  Although the patient had this occur, the patient states that she immediately had an improvement of the pressure in her lower pelvis. On evaluation of this blood, it is not clotting and this possibly implies that this is old blood which was expelled from the patient's vagina/uterus.  Regardless, I ordered a repeat CBC to ensure the patient is not actively bleeding.    I then performed a pelvic examination on the patient.  This demonstrated a large amount of blood in the patient's vaginal vault with what appeared to be fresh bleeding from the os however the os could not be visualized secondary to the mount of blood in the patient's vagina.  This was quite concerning.    At this time, I am awaiting a repeat CBC for the patient as well as the patient's ultrasound results.  Patient's case signed out to the oncoming physician however I feel the patient would likely require urgent/emergent OB/GYN evaluation given her bleeding.    Amount and/or Complexity of Data Reviewed  Labs: ordered. Decision-making details documented in ED Course.  Radiology: ordered. Decision-making details documented in ED Course.        Procedure  Procedures         [1]   Past Medical History:  Diagnosis Date    Abdominal left upper quadrant tenderness 01/10/2024    Acquired absence of ovaries,  unilateral 06/20/2018    History of oophorectomy, unilateral    Cough 01/10/2024    History of pica 01/10/2024    History of surgical procedure 04/03/2024    Intermittent upper abdominal pain 01/10/2024    Motion sickness 01/10/2024    Pelvic and perineal pain 09/13/2019    Pelvic pain    Pelvic pain in female 01/10/2024    Torsion of ovary and ovarian pedicle, unspecified side 06/20/2018    Ovarian torsion    Weight gain 06/16/2010   [2]   Past Surgical History:  Procedure Laterality Date    CYST REMOVAL      OTHER SURGICAL HISTORY  06/07/2019    Laparoscopic oophorectomy   [3]   Family History  Problem Relation Name Age of Onset    Breast cancer Mother  40        diagnosed at 40 and 62    Diabetes type II Father      Thyroid cancer Brother      Breast cancer Mother's Sister  52   [4]   Social History  Tobacco Use    Smoking status: Former     Types: Cigarettes    Smokeless tobacco: Never   Substance Use Topics    Alcohol use: Yes    Drug use: Never        Sony Teran MD  08/10/25 1927

## 2025-08-08 NOTE — DISCHARGE INSTRUCTIONS
Pelvic rest for 2 weeks ( nothing in the vagina)  Follow up  with Dr Monet is 2 weeks  Expect mild cramping and spotting for a few days, Tylenol and Motrin for Pain relief  Return to Emergency room for heavy bleeding

## 2025-08-08 NOTE — ANESTHESIA PROCEDURE NOTES
Airway  Date/Time: 8/8/2025 12:24 PM  Reason: elective    Airway not difficult    Staffing  Performed: CAA   Authorized by: Eliazar Mcdaniel MD    Performed by: JENNIFER Dong  Patient location during procedure: OR    Patient Condition  Indications for airway management: anesthesia  Patient position: sniffing  Sedation level: deep     Final Airway Details   Preoxygenated: yes  Final airway type: supraglottic airway  Successful airway: Supraglottic airway: I-Gel.  Size: 4  Number of attempts at approach: 1

## 2025-08-08 NOTE — PERIOPERATIVE NURSING NOTE
1346 Arrival to phase 2. Awake and alert. Denies pain. Ride not here yet.    1430 Ride at bedside. Discharge instructions reviewed with patient, verbalized understanding. PIV removed and pt getting dressed.     1452 Discharged to home. Peripad changed prior to discharge, small amount of blood on peripad.

## 2025-08-08 NOTE — OP NOTE
"DILATION AND SUCTION CURETTAGE OF UTERUS Operative Note     Date: 2025  OR Location: [facility] OR    Name: Larisa Villagomez \"Larisa PAYNE", : 1970, Age: 55 y.o., MRN: [id number], Sex: female    Diagnosis  Pre-op Diagnosis      * Excessive bleeding in premenopausal period [N92.4] Post-op Diagnosis     * Excessive bleeding in premenopausal period [N92.4]     Procedures  DILATION AND SUCTION CURETTAGE OF UTERUS  01201 - WI DILATION & CURETTAGE DX&/THER NONOBSTETRIC      Surgeons      * Eliazar Monet - Primary    Resident/Fellow/Other Assistant:  Surgeons and Role:  * No surgeons found with a matching role *    Staff:   Circulator: Helen  Scrub Person: Jenn  Scrub Person: Nya  Relief Circulator: Jeanette  Scrub Person: Adan    Anesthesia Staff: Anesthesiologist: Eliazar Mcdaniel MD  C-AA: JENNIFER Dong    Procedure Summary  Anesthesia: General  ASA: II  Estimated Blood Loss: 100mL  Intra-op Medications: Administrations occurring from 1045 to 1150 on 25:  * No intraprocedure medications in log *           Anesthesia Record               Intraprocedure I/O Totals          Intake    lactated Ringer's infusion 700.00 mL    Total Intake 700 mL       Output    Total Blood Loss - Surgical Delivery (mL) 2 mL    Total Output 2 mL       Net    Net Volume 698 mL       Other (could not be determined as input or output)    Surgical Delivery Estimated Blood Loss (mL) 2          Specimen:   ID Type Source Tests Collected by Time   1 : ENDOMETRIAL CURRETINGS Tissue ENDOMETRIUM CURETTINGS SURGICAL PATHOLOGY EXAM Eliazar Monet MD 2025 1248                 Drains and/or Catheters: * None in log *    Tourniquet Times: N/A        Implants: N/A    Findings:     Normal external female genitalia  Vagina and cervix without lesion  Dark blood and clot intermittently pouring from cervix  Cervix somewhat dilated  16-week mobile fibroid uterus with irregular contour, greater than 2 " "fingerbreadths from ASIS bilaterally there appears to be 2 dominant masses possibly 1 of which is pedunculated  Hemostasis was observed at the close of the case    Indications: Larisa Villagomez \"Larisa PAYNE" is an 55 y.o. female who is having surgery for Excessive bleeding in premenopausal period [N92.4].     The patient was seen in the preoperative area. The risks, benefits, complications, treatment options, non-operative alternatives, expected recovery and outcomes were discussed with the patient. The possibilities of reaction to medication, pulmonary aspiration, injury to surrounding structures, bleeding, recurrent infection, the need for additional procedures, failure to diagnose a condition, and creating a complication requiring transfusion or operation were discussed with the patient. The patient concurred with the proposed plan, giving informed consent.  The site of surgery was properly noted/marked if necessary per policy. The patient has been actively warmed in preoperative area. Preoperative antibiotics are not indicated. Venous thrombosis prophylaxis have been ordered including bilateral sequential compression devices    Procedure Details:     Consent was completed in pre-op holding area after discussing all risks/benefits/alternatives.     Pt was taken to OR with IV in place. Team huddle and timeout were performed with all appropriate team members.  Anesthesia was induced without difficulty. Pt was placed in dorsal lithotomy position in michael stirrups and prepped and draped in the usual sterile fashion.      A speculum was placed in the vagina. Anterior lip of cervix was grasped with a single tooth tenaculum.  The cervix was noted to be somewhat dilated. The cavity was entered with a 7 mm curette.  Curettage was performed without difficulty.  No active bleeding was noted tenaculum was removed.  Hemostasis was observed.    Sponge/lap/needle/instrument counts were correct x 2.      Pt was returned " to PACU in stable condition.  She will be discharged home from PACU.    Eliazar Monet MD  Obstetrics and Gynecology      Evidence of Infection: No   Complications:  None; patient tolerated the procedure well.    Disposition: PACU - hemodynamically stable.  Condition: stable                 Additional Details: N/A    Attending Attestation: I performed the procedure.    Eliazar Monet  Phone Number: [phone]

## 2025-08-09 NOTE — ANESTHESIA POSTPROCEDURE EVALUATION
"Patient: Larisa Villagomez \"Larisa LANG\"    Procedure Summary       Date: 08/08/25 Room / Location: [facility] OR 02 / [facility]    Anesthesia Start: 1219 Anesthesia Stop: 1301    Procedure: DILATION AND SUCTION CURETTAGE OF UTERUS (Uterus) Diagnosis:       Excessive bleeding in premenopausal period      (Excessive bleeding in premenopausal period [N92.4])    Surgeons: Eliazar Monet MD Responsible Provider: Eliazar Mcdaniel MD    Anesthesia Type: general ASA Status: 2 - Emergent            Anesthesia Type: general    Vitals Value Taken Time   /93 08/08/25 13:45   Temp 36.6 °C (97.9 °F) 08/08/25 13:00   Pulse 76 08/08/25 13:45   Resp 15 08/08/25 13:45   SpO2 100 % 08/08/25 13:45       Anesthesia Post Evaluation    Patient location during evaluation: bedside  Patient participation: complete - patient participated  Level of consciousness: awake  Pain management: adequate  Multimodal analgesia pain management approach  Airway patency: patent  Cardiovascular status: stable  Respiratory status: spontaneous ventilation and unassisted  Hydration status: acceptable  Postoperative Nausea and Vomiting: none  Comments: No significant PONV.        No notable events documented.    "

## 2025-08-17 ENCOUNTER — PATIENT MESSAGE (OUTPATIENT)
Dept: OBSTETRICS AND GYNECOLOGY | Facility: CLINIC | Age: 55
End: 2025-08-17
Payer: COMMERCIAL

## 2025-09-03 ENCOUNTER — APPOINTMENT (OUTPATIENT)
Dept: OBSTETRICS AND GYNECOLOGY | Facility: CLINIC | Age: 55
End: 2025-09-03
Payer: COMMERCIAL

## 2025-09-03 ASSESSMENT — PAIN SCALES - GENERAL: PAINLEVEL_OUTOF10: 8

## 2025-12-17 ENCOUNTER — APPOINTMENT (OUTPATIENT)
Dept: OBSTETRICS AND GYNECOLOGY | Facility: CLINIC | Age: 55
End: 2025-12-17
Payer: COMMERCIAL

## (undated) DEVICE — COVER HANDLE LIGHT, STERIS, BLUE, STERILE

## (undated) DEVICE — PREP TRAY, VAGINAL

## (undated) DEVICE — GOWN, SURGICAL, SMARTGOWN, XLARGE, STERILE

## (undated) DEVICE — COLLECTION SET, TISSUE, 3/8 TUBING W/HANDLE AND ADAPTER, STERILE

## (undated) DEVICE — KIT, GUIDEWIRE BUCKET, W/LID, F/FETAL REMAINS

## (undated) DEVICE — PAD, SANITARY, OBSTETRICAL, W/ADHSV STRIP,11 IN,LF

## (undated) DEVICE — TUBING, SUCTION, VACUUM, INTRAUTERINE, CURETTAGE, HANDLE, MALE ADAPTER, 6 FT X 0.375 IN

## (undated) DEVICE — Device

## (undated) DEVICE — TOWEL, SURGICAL, NEURO, O/R, 16 X 26, BLUE, STERILE

## (undated) DEVICE — GLOVE, SURGICAL, PROTEXIS PI W/NEU-THERA, 7.5, PF, LF

## (undated) DEVICE — ACCESSORY, FLUID MANAGEMENT, AVETA

## (undated) DEVICE — ACCESSORY, AVETA, WASTE MANAGEMENT WITH CAP

## (undated) DEVICE — LUBRICANT, SURGICAL, FOIL PACK, STERILE, 5 GRAM